# Patient Record
Sex: FEMALE | Race: WHITE | NOT HISPANIC OR LATINO | Employment: OTHER | URBAN - METROPOLITAN AREA
[De-identification: names, ages, dates, MRNs, and addresses within clinical notes are randomized per-mention and may not be internally consistent; named-entity substitution may affect disease eponyms.]

---

## 2017-08-30 ENCOUNTER — HISTORICAL (OUTPATIENT)
Dept: LAB | Facility: HOSPITAL | Age: 67
End: 2017-08-30

## 2017-08-30 LAB
ABS NEUT (OLG): 4.22 X10(3)/MCL (ref 2.1–9.2)
ALBUMIN SERPL-MCNC: 4 GM/DL (ref 3.4–5)
ALBUMIN/GLOB SERPL: 1.1 {RATIO}
ALP SERPL-CCNC: 80 UNIT/L (ref 38–126)
ALT SERPL-CCNC: 48 UNIT/L (ref 12–78)
AST SERPL-CCNC: 26 UNIT/L (ref 15–37)
BASOPHILS # BLD AUTO: 0 X10(3)/MCL (ref 0–0.2)
BASOPHILS NFR BLD AUTO: 1 %
BILIRUB SERPL-MCNC: 0.5 MG/DL (ref 0.2–1)
BILIRUBIN DIRECT+TOT PNL SERPL-MCNC: 0.2 MG/DL (ref 0–0.2)
BILIRUBIN DIRECT+TOT PNL SERPL-MCNC: 0.3 MG/DL (ref 0–0.8)
BUN SERPL-MCNC: 16 MG/DL (ref 7–18)
CALCIUM SERPL-MCNC: 9 MG/DL (ref 8.5–10.1)
CHLORIDE SERPL-SCNC: 102 MMOL/L (ref 98–107)
CHOLEST SERPL-MCNC: 239 MG/DL (ref 0–200)
CHOLEST/HDLC SERPL: 4.5 {RATIO} (ref 0–4)
CO2 SERPL-SCNC: 29 MMOL/L (ref 21–32)
CREAT SERPL-MCNC: 0.68 MG/DL (ref 0.55–1.02)
CREAT UR-MCNC: 83.2 MG/DL
DEPRECATED CALCIDIOL+CALCIFEROL SERPL-MC: 28.45 NG/ML (ref 30–80)
EOSINOPHIL # BLD AUTO: 0.3 X10(3)/MCL (ref 0–0.9)
EOSINOPHIL NFR BLD AUTO: 4 %
ERYTHROCYTE [DISTWIDTH] IN BLOOD BY AUTOMATED COUNT: 14.1 % (ref 11.5–17)
EST. AVERAGE GLUCOSE BLD GHB EST-MCNC: 143 MG/DL
GLOBULIN SER-MCNC: 3.7 GM/DL (ref 2.4–3.5)
GLUCOSE SERPL-MCNC: 140 MG/DL (ref 74–106)
HBA1C MFR BLD: 6.6 % (ref 4.2–6.3)
HCT VFR BLD AUTO: 46.6 % (ref 37–47)
HDLC SERPL-MCNC: 53 MG/DL (ref 35–60)
HGB BLD-MCNC: 14.5 GM/DL (ref 12–16)
LDLC SERPL CALC-MCNC: 151 MG/DL (ref 0–129)
LYMPHOCYTES # BLD AUTO: 2.4 X10(3)/MCL (ref 0.6–4.6)
LYMPHOCYTES NFR BLD AUTO: 32 %
MCH RBC QN AUTO: 28 PG (ref 27–31)
MCHC RBC AUTO-ENTMCNC: 31.1 GM/DL (ref 33–36)
MCV RBC AUTO: 90 FL (ref 80–94)
MICROALBUMIN UR-MCNC: 0.5 MG/DL
MICROALBUMIN/CREAT RATIO PNL UR: 6 MG/GM CR (ref 0–30)
MONOCYTES # BLD AUTO: 0.5 X10(3)/MCL (ref 0.1–1.3)
MONOCYTES NFR BLD AUTO: 7 %
NEUTROPHILS # BLD AUTO: 4.22 X10(3)/MCL (ref 2.1–9.2)
NEUTROPHILS NFR BLD AUTO: 56 %
PLATELET # BLD AUTO: 254 X10(3)/MCL (ref 130–400)
PMV BLD AUTO: 10.6 FL (ref 9.4–12.4)
POTASSIUM SERPL-SCNC: 4 MMOL/L (ref 3.5–5.1)
PROT SERPL-MCNC: 7.7 GM/DL (ref 6.4–8.2)
RBC # BLD AUTO: 5.18 X10(6)/MCL (ref 4.2–5.4)
SODIUM SERPL-SCNC: 140 MMOL/L (ref 136–145)
T4 FREE SERPL-MCNC: 1.03 NG/DL (ref 0.76–1.46)
TRIGL SERPL-MCNC: 176 MG/DL (ref 30–150)
TSH SERPL-ACNC: 1.61 MIU/ML (ref 0.36–3.74)
VLDLC SERPL CALC-MCNC: 35 MG/DL
WBC # SPEC AUTO: 7.5 X10(3)/MCL (ref 4.5–11.5)

## 2018-01-23 ENCOUNTER — HISTORICAL (OUTPATIENT)
Dept: LAB | Facility: HOSPITAL | Age: 68
End: 2018-01-23

## 2018-01-23 LAB
CHOLEST SERPL-MCNC: 216 MG/DL (ref 0–200)
CHOLEST/HDLC SERPL: 3.8 {RATIO} (ref 0–4)
DEPRECATED CALCIDIOL+CALCIFEROL SERPL-MC: 24.26 NG/ML (ref 30–80)
EST. AVERAGE GLUCOSE BLD GHB EST-MCNC: 157 MG/DL
HBA1C MFR BLD: 7.1 % (ref 4.2–6.3)
HDLC SERPL-MCNC: 57 MG/DL (ref 35–60)
LDLC SERPL CALC-MCNC: 135 MG/DL (ref 0–129)
TRIGL SERPL-MCNC: 122 MG/DL (ref 30–150)
VLDLC SERPL CALC-MCNC: 24 MG/DL

## 2018-05-23 ENCOUNTER — HISTORICAL (OUTPATIENT)
Dept: LAB | Facility: HOSPITAL | Age: 68
End: 2018-05-23

## 2018-05-23 LAB
CHOLEST SERPL-MCNC: 161 MG/DL (ref 0–200)
CHOLEST/HDLC SERPL: 2.9 {RATIO} (ref 0–4)
DEPRECATED CALCIDIOL+CALCIFEROL SERPL-MC: 30 NG/ML (ref 30–80)
EST. AVERAGE GLUCOSE BLD GHB EST-MCNC: 166 MG/DL
HBA1C MFR BLD: 7.4 % (ref 4.2–6.3)
HDLC SERPL-MCNC: 55 MG/DL (ref 35–60)
LDLC SERPL CALC-MCNC: 87 MG/DL (ref 0–129)
TRIGL SERPL-MCNC: 96 MG/DL (ref 30–150)
VLDLC SERPL CALC-MCNC: 19 MG/DL

## 2018-08-16 ENCOUNTER — HISTORICAL (OUTPATIENT)
Dept: RADIOLOGY | Facility: HOSPITAL | Age: 68
End: 2018-08-16

## 2018-08-28 ENCOUNTER — HISTORICAL (OUTPATIENT)
Dept: LAB | Facility: HOSPITAL | Age: 68
End: 2018-08-28

## 2018-08-28 LAB
EST. AVERAGE GLUCOSE BLD GHB EST-MCNC: 151 MG/DL
HBA1C MFR BLD: 6.9 % (ref 4.2–6.3)

## 2019-01-14 ENCOUNTER — HISTORICAL (OUTPATIENT)
Dept: LAB | Facility: HOSPITAL | Age: 69
End: 2019-01-14

## 2019-01-14 LAB
ABS NEUT (OLG): 3.37 X10(3)/MCL (ref 2.1–9.2)
ALBUMIN SERPL-MCNC: 4.1 GM/DL (ref 3.4–5)
ALBUMIN/GLOB SERPL: 1.3 {RATIO}
ALP SERPL-CCNC: 77 UNIT/L (ref 38–126)
ALT SERPL-CCNC: 64 UNIT/L (ref 12–78)
APPEARANCE, UA: CLEAR
AST SERPL-CCNC: 43 UNIT/L (ref 15–37)
BACTERIA SPEC CULT: ABNORMAL /HPF
BASOPHILS # BLD AUTO: 0 X10(3)/MCL (ref 0–0.2)
BASOPHILS NFR BLD AUTO: 0 %
BILIRUB SERPL-MCNC: 0.6 MG/DL (ref 0.2–1)
BILIRUB UR QL STRIP: NEGATIVE
BILIRUBIN DIRECT+TOT PNL SERPL-MCNC: 0.2 MG/DL (ref 0–0.2)
BILIRUBIN DIRECT+TOT PNL SERPL-MCNC: 0.4 MG/DL (ref 0–0.8)
BUN SERPL-MCNC: 12 MG/DL (ref 7–18)
CALCIUM SERPL-MCNC: 9.6 MG/DL (ref 8.5–10.1)
CHLORIDE SERPL-SCNC: 104 MMOL/L (ref 98–107)
CHOLEST SERPL-MCNC: 210 MG/DL (ref 0–200)
CHOLEST/HDLC SERPL: 4.3 {RATIO} (ref 0–4)
CO2 SERPL-SCNC: 25 MMOL/L (ref 21–32)
COLOR UR: YELLOW
CREAT SERPL-MCNC: 0.78 MG/DL (ref 0.55–1.02)
CREAT UR-MCNC: 88 MG/DL
EOSINOPHIL # BLD AUTO: 0.1 X10(3)/MCL (ref 0–0.9)
EOSINOPHIL NFR BLD AUTO: 2 %
ERYTHROCYTE [DISTWIDTH] IN BLOOD BY AUTOMATED COUNT: 13.4 % (ref 11.5–17)
EST. AVERAGE GLUCOSE BLD GHB EST-MCNC: 189 MG/DL
GLOBULIN SER-MCNC: 3.2 GM/DL (ref 2.4–3.5)
GLUCOSE (UA): NEGATIVE
GLUCOSE SERPL-MCNC: 155 MG/DL (ref 74–106)
HBA1C MFR BLD: 8.2 % (ref 4.2–6.3)
HCT VFR BLD AUTO: 46.8 % (ref 37–47)
HDLC SERPL-MCNC: 49 MG/DL (ref 35–60)
HGB BLD-MCNC: 14.5 GM/DL (ref 12–16)
HGB UR QL STRIP: NEGATIVE
KETONES UR QL STRIP: NEGATIVE
LDLC SERPL CALC-MCNC: 130 MG/DL (ref 0–129)
LEUKOCYTE ESTERASE UR QL STRIP: ABNORMAL
LYMPHOCYTES # BLD AUTO: 2.1 X10(3)/MCL (ref 0.6–4.6)
LYMPHOCYTES NFR BLD AUTO: 34 %
MCH RBC QN AUTO: 28.4 PG (ref 27–31)
MCHC RBC AUTO-ENTMCNC: 31 GM/DL (ref 33–36)
MCV RBC AUTO: 91.6 FL (ref 80–94)
MICROALBUMIN UR-MCNC: 1.5 MG/DL
MICROALBUMIN/CREAT RATIO PNL UR: 17 MG/GM CR (ref 0–30)
MONOCYTES # BLD AUTO: 0.5 X10(3)/MCL (ref 0.1–1.3)
MONOCYTES NFR BLD AUTO: 8 %
NEUTROPHILS # BLD AUTO: 3.37 X10(3)/MCL (ref 2.1–9.2)
NEUTROPHILS NFR BLD AUTO: 55 %
NITRITE UR QL STRIP: NEGATIVE
PH UR STRIP: 5 [PH] (ref 5–9)
PLATELET # BLD AUTO: 250 X10(3)/MCL (ref 130–400)
PMV BLD AUTO: 10.6 FL (ref 9.4–12.4)
POTASSIUM SERPL-SCNC: 4.2 MMOL/L (ref 3.5–5.1)
PROT SERPL-MCNC: 7.3 GM/DL (ref 6.4–8.2)
PROT UR QL STRIP: NEGATIVE
RBC # BLD AUTO: 5.11 X10(6)/MCL (ref 4.2–5.4)
RBC #/AREA URNS HPF: ABNORMAL /[HPF]
SODIUM SERPL-SCNC: 138 MMOL/L (ref 136–145)
SP GR UR STRIP: 1.01 (ref 1–1.03)
SQUAMOUS EPITHELIAL, UA: ABNORMAL
TRIGL SERPL-MCNC: 157 MG/DL (ref 30–150)
TSH SERPL-ACNC: 1.77 MIU/L (ref 0.36–3.74)
UROBILINOGEN UR STRIP-ACNC: 0.2
VLDLC SERPL CALC-MCNC: 31 MG/DL
WBC # SPEC AUTO: 6.1 X10(3)/MCL (ref 4.5–11.5)
WBC #/AREA URNS HPF: 5 /HPF (ref 0–3)

## 2019-03-14 ENCOUNTER — HISTORICAL (OUTPATIENT)
Dept: RADIOLOGY | Facility: HOSPITAL | Age: 69
End: 2019-03-14

## 2019-03-19 ENCOUNTER — HISTORICAL (OUTPATIENT)
Dept: LAB | Facility: HOSPITAL | Age: 69
End: 2019-03-19

## 2019-03-19 ENCOUNTER — HISTORICAL (OUTPATIENT)
Dept: PREADMISSION TESTING | Facility: HOSPITAL | Age: 69
End: 2019-03-19

## 2019-03-19 LAB
ABS NEUT (OLG): 4.74 X10(3)/MCL (ref 2.1–9.2)
ALBUMIN SERPL-MCNC: 3.7 GM/DL (ref 3.4–5)
ALBUMIN/GLOB SERPL: 1 {RATIO}
ALP SERPL-CCNC: 89 UNIT/L (ref 38–126)
ALT SERPL-CCNC: 42 UNIT/L (ref 12–78)
AST SERPL-CCNC: 31 UNIT/L (ref 15–37)
BASOPHILS # BLD AUTO: 0 X10(3)/MCL (ref 0–0.2)
BASOPHILS NFR BLD AUTO: 0 %
BILIRUB SERPL-MCNC: 0.4 MG/DL (ref 0.2–1)
BILIRUBIN DIRECT+TOT PNL SERPL-MCNC: 0.2 MG/DL (ref 0–0.2)
BILIRUBIN DIRECT+TOT PNL SERPL-MCNC: 0.2 MG/DL (ref 0–0.8)
BUN SERPL-MCNC: 15 MG/DL (ref 7–18)
CALCIUM SERPL-MCNC: 9 MG/DL (ref 8.5–10.1)
CHLORIDE SERPL-SCNC: 101 MMOL/L (ref 98–107)
CO2 SERPL-SCNC: 25 MMOL/L (ref 21–32)
CREAT SERPL-MCNC: 0.61 MG/DL (ref 0.55–1.02)
EOSINOPHIL # BLD AUTO: 0.1 X10(3)/MCL (ref 0–0.9)
EOSINOPHIL NFR BLD AUTO: 2 %
ERYTHROCYTE [DISTWIDTH] IN BLOOD BY AUTOMATED COUNT: 13.7 % (ref 11.5–17)
GLOBULIN SER-MCNC: 3.6 GM/DL (ref 2.4–3.5)
GLUCOSE SERPL-MCNC: 161 MG/DL (ref 74–106)
HCT VFR BLD AUTO: 43.8 % (ref 37–47)
HGB BLD-MCNC: 14.1 GM/DL (ref 12–16)
LYMPHOCYTES # BLD AUTO: 2.4 X10(3)/MCL (ref 0.6–4.6)
LYMPHOCYTES NFR BLD AUTO: 30 %
MCH RBC QN AUTO: 28.6 PG (ref 27–31)
MCHC RBC AUTO-ENTMCNC: 32.2 GM/DL (ref 33–36)
MCV RBC AUTO: 88.8 FL (ref 80–94)
MONOCYTES # BLD AUTO: 0.6 X10(3)/MCL (ref 0.1–1.3)
MONOCYTES NFR BLD AUTO: 8 %
NEUTROPHILS # BLD AUTO: 4.74 X10(3)/MCL (ref 2.1–9.2)
NEUTROPHILS NFR BLD AUTO: 60 %
PLATELET # BLD AUTO: 257 X10(3)/MCL (ref 130–400)
PMV BLD AUTO: 11.1 FL (ref 9.4–12.4)
POTASSIUM SERPL-SCNC: 4.4 MMOL/L (ref 3.5–5.1)
PROT SERPL-MCNC: 7.3 GM/DL (ref 6.4–8.2)
RBC # BLD AUTO: 4.93 X10(6)/MCL (ref 4.2–5.4)
SODIUM SERPL-SCNC: 137 MMOL/L (ref 136–145)
WBC # SPEC AUTO: 8 X10(3)/MCL (ref 4.5–11.5)

## 2019-03-28 ENCOUNTER — HISTORICAL (OUTPATIENT)
Dept: RADIOLOGY | Facility: HOSPITAL | Age: 69
End: 2019-03-28

## 2019-03-29 ENCOUNTER — HISTORICAL (OUTPATIENT)
Dept: SURGERY | Facility: HOSPITAL | Age: 69
End: 2019-03-29

## 2019-05-06 ENCOUNTER — HISTORICAL (OUTPATIENT)
Dept: SURGERY | Facility: HOSPITAL | Age: 69
End: 2019-05-06

## 2019-07-11 ENCOUNTER — HISTORICAL (OUTPATIENT)
Dept: LAB | Facility: HOSPITAL | Age: 69
End: 2019-07-11

## 2019-07-11 LAB
ALBUMIN SERPL-MCNC: 3.7 GM/DL (ref 3.4–5)
ALBUMIN/GLOB SERPL: 0.9 RATIO (ref 1–2)
ALP SERPL-CCNC: 88 UNIT/L (ref 45–117)
ALT SERPL-CCNC: 60 UNIT/L (ref 13–56)
AST SERPL-CCNC: 49 UNIT/L (ref 15–37)
BILIRUB SERPL-MCNC: 0.4 MG/DL (ref 0.2–1)
BILIRUBIN DIRECT+TOT PNL SERPL-MCNC: 0.13 MG/DL (ref 0–0.2)
BILIRUBIN DIRECT+TOT PNL SERPL-MCNC: 0.27 MG/DL (ref 0–1)
BUN SERPL-MCNC: 12 MG/DL (ref 7–18)
CALCIUM SERPL-MCNC: 8.9 MG/DL (ref 8.5–10.1)
CHLORIDE SERPL-SCNC: 107 MMOL/L (ref 98–107)
CHOLEST SERPL-MCNC: 148 MG/DL (ref 0–199)
CHOLEST/HDLC SERPL: 3 MG/DL (ref 0–8)
CO2 SERPL-SCNC: 22 MMOL/L (ref 21–32)
CREAT SERPL-MCNC: 0.78 MG/DL (ref 0.55–1.02)
EST. AVERAGE GLUCOSE BLD GHB EST-MCNC: 183 MG/DL
GLOBULIN SER-MCNC: 4 GM/DL (ref 2–4)
GLUCOSE SERPL-MCNC: 189 MG/DL (ref 74–106)
HBA1C MFR BLD: 8 % (ref 4.2–6.3)
HDLC SERPL-MCNC: 51 MG/DL
LDLC SERPL CALC-MCNC: 74 MG/DL (ref 0–129)
POTASSIUM SERPL-SCNC: 4.2 MMOL/L (ref 3.5–5.1)
PROT SERPL-MCNC: 7.5 GM/DL (ref 6.4–8.2)
SODIUM SERPL-SCNC: 138 MMOL/L (ref 136–145)
TRIGL SERPL-MCNC: 114 MG/DL (ref 0–149)
VLDLC SERPL CALC-MCNC: 23 MG/DL

## 2019-08-19 ENCOUNTER — HISTORICAL (OUTPATIENT)
Dept: RADIOLOGY | Facility: HOSPITAL | Age: 69
End: 2019-08-19

## 2019-08-19 ENCOUNTER — HISTORICAL (OUTPATIENT)
Dept: ADMINISTRATIVE | Facility: HOSPITAL | Age: 69
End: 2019-08-19

## 2020-01-14 ENCOUNTER — HISTORICAL (OUTPATIENT)
Dept: LAB | Facility: HOSPITAL | Age: 70
End: 2020-01-14

## 2020-01-14 LAB
ABS NEUT (OLG): 4.05 X10(3)/MCL (ref 2.1–9.2)
ALBUMIN SERPL-MCNC: 3.9 GM/DL (ref 3.4–5)
ALBUMIN/GLOB SERPL: 1 RATIO (ref 1–2)
ALP SERPL-CCNC: 66 UNIT/L (ref 45–117)
ALT SERPL-CCNC: 44 UNIT/L (ref 13–56)
APPEARANCE, UA: ABNORMAL
AST SERPL-CCNC: 37 UNIT/L (ref 15–37)
BACTERIA SPEC CULT: ABNORMAL /HPF
BASOPHILS # BLD AUTO: 0.03 X10(3)/MCL (ref 0–0.2)
BASOPHILS NFR BLD AUTO: 0.4 % (ref 0–1)
BILIRUB SERPL-MCNC: 0.4 MG/DL (ref 0.2–1)
BILIRUB UR QL STRIP: NEGATIVE
BILIRUBIN DIRECT+TOT PNL SERPL-MCNC: 0.17 MG/DL (ref 0–0.2)
BILIRUBIN DIRECT+TOT PNL SERPL-MCNC: 0.23 MG/DL (ref 0–1)
BUN SERPL-MCNC: 16 MG/DL (ref 7–18)
CALCIUM SERPL-MCNC: 9.4 MG/DL (ref 8.5–10.1)
CHLORIDE SERPL-SCNC: 106 MMOL/L (ref 98–107)
CHOLEST SERPL-MCNC: 151 MG/DL (ref 0–199)
CHOLEST/HDLC SERPL: 3 {RATIO}
CO2 SERPL-SCNC: 26 MMOL/L (ref 21–32)
COLOR UR: YELLOW
CREAT SERPL-MCNC: 0.83 MG/DL (ref 0.55–1.02)
CREAT UR-MCNC: 77 MG/DL
DEPRECATED CALCIDIOL+CALCIFEROL SERPL-MC: 29.13 NG/ML (ref 30–80)
EOSINOPHIL # BLD AUTO: 0.13 X10(3)/MCL (ref 0–0.9)
EOSINOPHIL NFR BLD AUTO: 1.8 % (ref 0–6.4)
ERYTHROCYTE [DISTWIDTH] IN BLOOD BY AUTOMATED COUNT: 14.6 % (ref 11.5–17)
EST. AVERAGE GLUCOSE BLD GHB EST-MCNC: 180 MG/DL
GLOBULIN SER-MCNC: 4 GM/DL (ref 2–4)
GLUCOSE (UA): NEGATIVE
GLUCOSE SERPL-MCNC: 148 MG/DL (ref 74–106)
HBA1C MFR BLD: 7.9 % (ref 4.2–6.3)
HCT VFR BLD AUTO: 39.4 % (ref 37–47)
HDLC SERPL-MCNC: 55 MG/DL
HGB BLD-MCNC: 13 GM/DL (ref 12–16)
HGB UR QL STRIP: NEGATIVE
IMM GRANULOCYTES # BLD AUTO: 0.02 10*3/UL (ref 0–0.02)
IMM GRANULOCYTES NFR BLD AUTO: 0.3 % (ref 0–0.43)
KETONES UR QL STRIP: NEGATIVE
LDLC SERPL CALC-MCNC: 74 MG/DL (ref 0–129)
LEUKOCYTE ESTERASE UR QL STRIP: ABNORMAL
LYMPHOCYTES # BLD AUTO: 2.28 X10(3)/MCL (ref 0.6–4.6)
LYMPHOCYTES NFR BLD AUTO: 32.4 % (ref 16–44)
MCH RBC QN AUTO: 28.5 PG (ref 27–31)
MCHC RBC AUTO-ENTMCNC: 33 GM/DL (ref 33–36)
MCV RBC AUTO: 86.4 FL (ref 80–94)
MICROALBUMIN UR-MCNC: 2.1 MG/DL
MICROALBUMIN/CREAT RATIO PNL UR: 27.3 MG/GM CR (ref 0–30)
MONOCYTES # BLD AUTO: 0.52 X10(3)/MCL (ref 0.1–1.3)
MONOCYTES NFR BLD AUTO: 7.4 % (ref 4–12.1)
NEUTROPHILS # BLD AUTO: 4.05 X10(3)/MCL (ref 2.1–9.2)
NEUTROPHILS NFR BLD AUTO: 57.7 % (ref 43–73)
NITRITE UR QL STRIP: POSITIVE
NRBC BLD AUTO-RTO: 0 % (ref 0–0.2)
PH UR STRIP: 5.5 [PH] (ref 5–7)
PLATELET # BLD AUTO: 242 X10(3)/MCL (ref 130–400)
PMV BLD AUTO: 10.1 FL (ref 7.4–10.4)
POTASSIUM SERPL-SCNC: 4.5 MMOL/L (ref 3.5–5.1)
PROT SERPL-MCNC: 7.9 GM/DL (ref 6.4–8.2)
PROT UR QL STRIP: NEGATIVE
RBC # BLD AUTO: 4.56 X10(6)/MCL (ref 4.2–5.4)
RBC #/AREA URNS HPF: 0 /[HPF]
SODIUM SERPL-SCNC: 138 MMOL/L (ref 136–145)
SP GR UR STRIP: 1.02 (ref 1–1.03)
SQUAMOUS EPITHELIAL, UA: ABNORMAL /LPF
TRIGL SERPL-MCNC: 112 MG/DL (ref 0–149)
TSH SERPL-ACNC: 2.56 MIU/ML (ref 0.36–3.74)
UROBILINOGEN UR STRIP-ACNC: NEGATIVE
VLDLC SERPL CALC-MCNC: 22 MG/DL
WBC # SPEC AUTO: 7 X10(3)/MCL (ref 4.5–11.5)
WBC #/AREA URNS HPF: ABNORMAL /HPF

## 2020-02-06 ENCOUNTER — HISTORICAL (OUTPATIENT)
Dept: RADIOLOGY | Facility: HOSPITAL | Age: 70
End: 2020-02-06

## 2020-03-12 LAB — NONINV COLON CA DNA+OCC BLD SCRN STL QL: NEGATIVE

## 2020-07-15 ENCOUNTER — HISTORICAL (OUTPATIENT)
Dept: LAB | Facility: HOSPITAL | Age: 70
End: 2020-07-15

## 2020-07-15 LAB
ABS NEUT (OLG): 4.03 X10(3)/MCL (ref 2.1–9.2)
ALBUMIN SERPL-MCNC: 4 GM/DL (ref 3.4–4.8)
ALBUMIN/GLOB SERPL: 1.1 RATIO (ref 1.1–2)
ALP SERPL-CCNC: 70 UNIT/L (ref 40–150)
ALT SERPL-CCNC: 20 UNIT/L (ref 0–55)
APPEARANCE, UA: ABNORMAL
AST SERPL-CCNC: 18 UNIT/L (ref 5–34)
BACTERIA SPEC CULT: ABNORMAL /HPF
BASOPHILS # BLD AUTO: 0.04 X10(3)/MCL (ref 0–0.2)
BASOPHILS NFR BLD AUTO: 0.5 % (ref 0–1)
BILIRUB SERPL-MCNC: 0.5 MG/DL (ref 0.2–1.2)
BILIRUB UR QL STRIP: NEGATIVE
BILIRUBIN DIRECT+TOT PNL SERPL-MCNC: 0.2 MG/DL (ref 0–0.5)
BILIRUBIN DIRECT+TOT PNL SERPL-MCNC: 0.3 MG/DL (ref 0–0.8)
BUN SERPL-MCNC: 15.6 MG/DL (ref 9.8–20.1)
CALCIUM SERPL-MCNC: 10 MG/DL (ref 8.4–10.2)
CHLORIDE SERPL-SCNC: 104 MMOL/L (ref 98–107)
CHOLEST SERPL-MCNC: 161 MG/DL
CHOLEST/HDLC SERPL: 3 {RATIO} (ref 0–5)
CO2 SERPL-SCNC: 26 MMOL/L (ref 23–31)
COLOR UR: ABNORMAL
CREAT SERPL-MCNC: 0.7 MG/DL (ref 0.57–1.11)
DEPRECATED CALCIDIOL+CALCIFEROL SERPL-MC: 40.2 NG/ML (ref 6.6–49.9)
EOSINOPHIL # BLD AUTO: 0.1 X10(3)/MCL (ref 0–0.9)
EOSINOPHIL NFR BLD AUTO: 1.3 % (ref 0–6.4)
ERYTHROCYTE [DISTWIDTH] IN BLOOD BY AUTOMATED COUNT: 14.3 % (ref 11.5–17)
EST. AVERAGE GLUCOSE BLD GHB EST-MCNC: 148.5 MG/DL
GLOBULIN SER-MCNC: 3.7 GM/DL (ref 2.4–3.5)
GLUCOSE (UA): NEGATIVE
GLUCOSE SERPL-MCNC: 144 MG/DL (ref 82–115)
HBA1C MFR BLD: 6.8 %
HCT VFR BLD AUTO: 42 % (ref 37–47)
HDLC SERPL-MCNC: 52 MG/DL (ref 40–60)
HGB BLD-MCNC: 13.6 GM/DL (ref 12–16)
HGB UR QL STRIP: NEGATIVE
IMM GRANULOCYTES # BLD AUTO: 0.02 10*3/UL (ref 0–0.02)
IMM GRANULOCYTES NFR BLD AUTO: 0.3 % (ref 0–0.43)
KETONES UR QL STRIP: NEGATIVE
LDLC SERPL CALC-MCNC: 86 MG/DL (ref 50–140)
LEUKOCYTE ESTERASE UR QL STRIP: ABNORMAL
LYMPHOCYTES # BLD AUTO: 2.92 X10(3)/MCL (ref 0.6–4.6)
LYMPHOCYTES NFR BLD AUTO: 38.3 % (ref 16–44)
MCH RBC QN AUTO: 28.3 PG (ref 27–31)
MCHC RBC AUTO-ENTMCNC: 32.4 GM/DL (ref 33–36)
MCV RBC AUTO: 87.5 FL (ref 80–94)
MONOCYTES # BLD AUTO: 0.51 X10(3)/MCL (ref 0.1–1.3)
MONOCYTES NFR BLD AUTO: 6.7 % (ref 4–12.1)
NEUTROPHILS # BLD AUTO: 4.03 X10(3)/MCL (ref 2.1–9.2)
NEUTROPHILS NFR BLD AUTO: 52.9 % (ref 43–73)
NITRITE UR QL STRIP: NEGATIVE
NRBC BLD AUTO-RTO: 0 % (ref 0–0.2)
PH UR STRIP: 5.5 [PH] (ref 5–7)
PLATELET # BLD AUTO: 233 X10(3)/MCL (ref 130–400)
PMV BLD AUTO: 10.5 FL (ref 7.4–10.4)
POTASSIUM SERPL-SCNC: 4.1 MMOL/L (ref 3.5–5.1)
PROT SERPL-MCNC: 7.7 GM/DL (ref 5.8–7.6)
PROT UR QL STRIP: NEGATIVE
RBC # BLD AUTO: 4.8 X10(6)/MCL (ref 4.2–5.4)
RBC #/AREA URNS HPF: 0 /[HPF]
SODIUM SERPL-SCNC: 140 MMOL/L (ref 136–145)
SP GR UR STRIP: 1.02 (ref 1–1.03)
SQUAMOUS EPITHELIAL, UA: ABNORMAL /LPF
TRIGL SERPL-MCNC: 113 MG/DL (ref 0–150)
UROBILINOGEN UR STRIP-ACNC: NEGATIVE
VLDLC SERPL CALC-MCNC: 23 MG/DL
WBC # SPEC AUTO: 7.6 X10(3)/MCL (ref 4.5–11.5)
WBC #/AREA URNS HPF: ABNORMAL /HPF

## 2020-07-18 LAB — FINAL CULTURE: NORMAL

## 2020-08-24 ENCOUNTER — HISTORICAL (OUTPATIENT)
Dept: RADIOLOGY | Facility: HOSPITAL | Age: 70
End: 2020-08-24

## 2021-01-18 ENCOUNTER — HISTORICAL (OUTPATIENT)
Dept: LAB | Facility: HOSPITAL | Age: 71
End: 2021-01-18

## 2021-01-18 LAB
ABS NEUT (OLG): 3.96 X10(3)/MCL (ref 2.1–9.2)
ALBUMIN SERPL-MCNC: 4.3 GM/DL (ref 3.4–4.8)
ALBUMIN/GLOB SERPL: 1.2 RATIO (ref 1.1–2)
ALP SERPL-CCNC: 64 UNIT/L (ref 40–150)
ALT SERPL-CCNC: 19 UNIT/L (ref 0–55)
APPEARANCE, UA: CLEAR
AST SERPL-CCNC: 19 UNIT/L (ref 5–34)
BACTERIA SPEC CULT: NORMAL
BASOPHILS # BLD AUTO: 0.03 X10(3)/MCL (ref 0–0.2)
BASOPHILS NFR BLD AUTO: 0.4 % (ref 0–1)
BILIRUB SERPL-MCNC: 0.5 MG/DL (ref 0.2–1.2)
BILIRUB UR QL STRIP: NEGATIVE
BILIRUBIN DIRECT+TOT PNL SERPL-MCNC: 0.2 MG/DL (ref 0–0.5)
BILIRUBIN DIRECT+TOT PNL SERPL-MCNC: 0.3 MG/DL (ref 0–0.8)
BUN SERPL-MCNC: 11.1 MG/DL (ref 9.8–20.1)
CALCIUM SERPL-MCNC: 9.9 MG/DL (ref 8.4–10.2)
CHLORIDE SERPL-SCNC: 105 MMOL/L (ref 98–107)
CHOLEST SERPL-MCNC: 150 MG/DL
CHOLEST/HDLC SERPL: 3 {RATIO} (ref 0–5)
CO2 SERPL-SCNC: 27 MMOL/L (ref 23–31)
COLOR UR: YELLOW
CREAT SERPL-MCNC: 0.74 MG/DL (ref 0.57–1.11)
CREAT UR-MCNC: 38.3 MG/DL (ref 45–106)
EOSINOPHIL # BLD AUTO: 0.12 X10(3)/MCL (ref 0–0.9)
EOSINOPHIL NFR BLD AUTO: 1.7 % (ref 0–6.4)
ERYTHROCYTE [DISTWIDTH] IN BLOOD BY AUTOMATED COUNT: 14.1 % (ref 11.5–17)
EST. AVERAGE GLUCOSE BLD GHB EST-MCNC: 148.5 MG/DL
GLOBULIN SER-MCNC: 3.5 GM/DL (ref 2.4–3.5)
GLUCOSE (UA): NEGATIVE
GLUCOSE SERPL-MCNC: 147 MG/DL (ref 82–115)
HBA1C MFR BLD: 6.8 %
HCT VFR BLD AUTO: 43.7 % (ref 37–47)
HDLC SERPL-MCNC: 48 MG/DL (ref 40–60)
HGB BLD-MCNC: 13.9 GM/DL (ref 12–16)
HGB UR QL STRIP: NEGATIVE
IMM GRANULOCYTES # BLD AUTO: 0.02 10*3/UL (ref 0–0.02)
IMM GRANULOCYTES NFR BLD AUTO: 0.3 % (ref 0–0.43)
KETONES UR QL STRIP: NEGATIVE
LDLC SERPL CALC-MCNC: 75 MG/DL (ref 50–140)
LEUKOCYTE ESTERASE UR QL STRIP: ABNORMAL
LYMPHOCYTES # BLD AUTO: 2.6 X10(3)/MCL (ref 0.6–4.6)
LYMPHOCYTES NFR BLD AUTO: 36 % (ref 16–44)
MCH RBC QN AUTO: 28 PG (ref 27–31)
MCHC RBC AUTO-ENTMCNC: 31.8 GM/DL (ref 33–36)
MCV RBC AUTO: 88.1 FL (ref 80–94)
MICROALBUMIN UR-MCNC: <5 UG/ML
MICROALBUMIN/CREAT RATIO PNL UR: <13.1 MG/GM CR (ref 0–30)
MONOCYTES # BLD AUTO: 0.49 X10(3)/MCL (ref 0.1–1.3)
MONOCYTES NFR BLD AUTO: 6.8 % (ref 4–12.1)
NEUTROPHILS # BLD AUTO: 3.96 X10(3)/MCL (ref 2.1–9.2)
NEUTROPHILS NFR BLD AUTO: 54.8 % (ref 43–73)
NITRITE UR QL STRIP: NEGATIVE
NRBC BLD AUTO-RTO: 0 % (ref 0–0.2)
PH UR STRIP: 5.5 [PH] (ref 5–7)
PLATELET # BLD AUTO: 222 X10(3)/MCL (ref 130–400)
PMV BLD AUTO: 10.8 FL (ref 7.4–10.4)
POTASSIUM SERPL-SCNC: 4.9 MMOL/L (ref 3.5–5.1)
PROT SERPL-MCNC: 7.8 GM/DL (ref 5.8–7.6)
PROT UR QL STRIP: NEGATIVE
RBC # BLD AUTO: 4.96 X10(6)/MCL (ref 4.2–5.4)
RBC #/AREA URNS HPF: 0 /[HPF]
SODIUM SERPL-SCNC: 140 MMOL/L (ref 136–145)
SP GR UR STRIP: 1.02 (ref 1–1.03)
SQUAMOUS EPITHELIAL, UA: NORMAL /LPF
TRIGL SERPL-MCNC: 133 MG/DL (ref 0–150)
TSH SERPL-ACNC: 2.48 UIU/ML (ref 0.35–4.94)
UROBILINOGEN UR STRIP-ACNC: NEGATIVE
VLDLC SERPL CALC-MCNC: 27 MG/DL
WBC # SPEC AUTO: 7.2 X10(3)/MCL (ref 4.5–11.5)
WBC #/AREA URNS HPF: NORMAL /HPF

## 2021-01-20 LAB — FINAL CULTURE: NORMAL

## 2021-04-26 ENCOUNTER — HISTORICAL (OUTPATIENT)
Dept: RADIOLOGY | Facility: HOSPITAL | Age: 71
End: 2021-04-26

## 2021-07-19 ENCOUNTER — HISTORICAL (OUTPATIENT)
Dept: LAB | Facility: HOSPITAL | Age: 71
End: 2021-07-19

## 2021-07-19 LAB
ALBUMIN SERPL-MCNC: 4 GM/DL (ref 3.4–4.8)
ALBUMIN/GLOB SERPL: 1.1 RATIO (ref 1.1–2)
ALP SERPL-CCNC: 62 UNIT/L (ref 40–150)
ALT SERPL-CCNC: 12 UNIT/L (ref 0–55)
AST SERPL-CCNC: 14 UNIT/L (ref 5–34)
BILIRUB SERPL-MCNC: 0.6 MG/DL (ref 0.2–1.2)
BILIRUBIN DIRECT+TOT PNL SERPL-MCNC: 0.3 MG/DL (ref 0–0.5)
BILIRUBIN DIRECT+TOT PNL SERPL-MCNC: 0.3 MG/DL (ref 0–0.8)
BUN SERPL-MCNC: 12.1 MG/DL (ref 9.8–20.1)
CALCIUM SERPL-MCNC: 10.1 MG/DL (ref 8.4–10.2)
CHLORIDE SERPL-SCNC: 108 MMOL/L (ref 98–107)
CO2 SERPL-SCNC: 24 MMOL/L (ref 23–31)
CREAT SERPL-MCNC: 0.77 MG/DL (ref 0.57–1.11)
EST. AVERAGE GLUCOSE BLD GHB EST-MCNC: 128.4 MG/DL
GLOBULIN SER-MCNC: 3.6 GM/DL (ref 2.4–3.5)
GLUCOSE SERPL-MCNC: 131 MG/DL (ref 82–115)
HBA1C MFR BLD: 6.1 %
POTASSIUM SERPL-SCNC: 4.4 MMOL/L (ref 3.5–5.1)
PROT SERPL-MCNC: 7.6 GM/DL (ref 5.8–7.6)
SODIUM SERPL-SCNC: 142 MMOL/L (ref 136–145)

## 2021-09-07 ENCOUNTER — HISTORICAL (OUTPATIENT)
Dept: RADIOLOGY | Facility: HOSPITAL | Age: 71
End: 2021-09-07

## 2022-01-20 ENCOUNTER — HISTORICAL (OUTPATIENT)
Dept: LAB | Facility: HOSPITAL | Age: 72
End: 2022-01-20

## 2022-01-20 LAB
ABS NEUT (OLG): 3.54 X10(3)/MCL (ref 2.1–9.2)
ALBUMIN SERPL-MCNC: 4.1 GM/DL (ref 3.4–4.8)
ALBUMIN/GLOB SERPL: 1.2 RATIO (ref 1.1–2)
ALP SERPL-CCNC: 69 UNIT/L (ref 40–150)
ALT SERPL-CCNC: 16 UNIT/L (ref 0–55)
APPEARANCE, UA: ABNORMAL
AST SERPL-CCNC: 16 UNIT/L (ref 5–34)
BACTERIA SPEC CULT: NORMAL
BASOPHILS # BLD AUTO: 0.04 X10(3)/MCL (ref 0–0.2)
BASOPHILS NFR BLD AUTO: 0.6 % (ref 0–1)
BILIRUB SERPL-MCNC: 0.6 MG/DL (ref 0.2–1.2)
BILIRUB UR QL STRIP: NEGATIVE
BILIRUBIN DIRECT+TOT PNL SERPL-MCNC: 0.3 MG/DL (ref 0–0.5)
BILIRUBIN DIRECT+TOT PNL SERPL-MCNC: 0.3 MG/DL (ref 0–0.8)
BUN SERPL-MCNC: 14.2 MG/DL (ref 9.8–20.1)
CALCIUM SERPL-MCNC: 9.9 MG/DL (ref 8.4–10.2)
CHLORIDE SERPL-SCNC: 103 MMOL/L (ref 98–107)
CHOLEST SERPL-MCNC: 169 MG/DL
CHOLEST/HDLC SERPL: 3 {RATIO} (ref 0–5)
CO2 SERPL-SCNC: 27 MMOL/L (ref 23–31)
COLOR UR: YELLOW
CREAT SERPL-MCNC: 0.77 MG/DL (ref 0.57–1.11)
CREAT UR-MCNC: 67.1 MG/DL (ref 45–106)
DEPRECATED CALCIDIOL+CALCIFEROL SERPL-MC: 33.5 NG/ML (ref 30–80)
EOSINOPHIL # BLD AUTO: 0.11 X10(3)/MCL (ref 0–0.9)
EOSINOPHIL NFR BLD AUTO: 1.7 % (ref 0–6.4)
ERYTHROCYTE [DISTWIDTH] IN BLOOD BY AUTOMATED COUNT: 13.7 % (ref 11.5–17)
EST. AVERAGE GLUCOSE BLD GHB EST-MCNC: 177.2 MG/DL
GLOBULIN SER-MCNC: 3.5 GM/DL (ref 2.4–3.5)
GLUCOSE (UA): NEGATIVE
GLUCOSE SERPL-MCNC: 220 MG/DL (ref 82–115)
HBA1C MFR BLD: 7.8 %
HCT VFR BLD AUTO: 41.5 % (ref 37–47)
HDLC SERPL-MCNC: 50 MG/DL (ref 40–60)
HGB BLD-MCNC: 13.6 GM/DL (ref 12–16)
HGB UR QL STRIP: NEGATIVE
IMM GRANULOCYTES # BLD AUTO: 0.01 10*3/UL (ref 0–0.02)
IMM GRANULOCYTES NFR BLD AUTO: 0.2 % (ref 0–0.43)
KETONES UR QL STRIP: NEGATIVE
LDLC SERPL CALC-MCNC: 93 MG/DL (ref 50–140)
LEUKOCYTE ESTERASE UR QL STRIP: ABNORMAL
LYMPHOCYTES # BLD AUTO: 2.47 X10(3)/MCL (ref 0.6–4.6)
LYMPHOCYTES NFR BLD AUTO: 37.5 % (ref 16–44)
MCH RBC QN AUTO: 28.3 PG (ref 27–31)
MCHC RBC AUTO-ENTMCNC: 32.8 GM/DL (ref 33–36)
MCV RBC AUTO: 86.3 FL (ref 80–94)
MICROALBUMIN UR-MCNC: <5 UG/ML
MICROALBUMIN/CREAT RATIO PNL UR: <7.5 MG/GM CR (ref 0–30)
MONOCYTES # BLD AUTO: 0.42 X10(3)/MCL (ref 0.1–1.3)
MONOCYTES NFR BLD AUTO: 6.4 % (ref 4–12.1)
NEUTROPHILS # BLD AUTO: 3.54 X10(3)/MCL (ref 2.1–9.2)
NEUTROPHILS NFR BLD AUTO: 53.6 % (ref 43–73)
NITRITE UR QL STRIP: NEGATIVE
NRBC BLD AUTO-RTO: 0 % (ref 0–0.2)
PH UR STRIP: 5.5 [PH] (ref 5–7)
PLATELET # BLD AUTO: 259 X10(3)/MCL (ref 130–400)
PMV BLD AUTO: 10.3 FL (ref 7.4–10.4)
POTASSIUM SERPL-SCNC: 5.6 MMOL/L (ref 3.5–5.1)
PROT SERPL-MCNC: 7.6 GM/DL (ref 5.8–7.6)
PROT UR QL STRIP: NEGATIVE
RBC # BLD AUTO: 4.81 X10(6)/MCL (ref 4.2–5.4)
RBC #/AREA URNS HPF: 0 /[HPF]
SODIUM SERPL-SCNC: 139 MMOL/L (ref 136–145)
SP GR UR STRIP: 1.02 (ref 1–1.03)
SQUAMOUS EPITHELIAL, UA: NORMAL /LPF
TRIGL SERPL-MCNC: 128 MG/DL (ref 0–150)
TSH SERPL-ACNC: 1.67 UIU/ML (ref 0.35–4.94)
UROBILINOGEN UR STRIP-ACNC: NEGATIVE
VLDLC SERPL CALC-MCNC: 26 MG/DL
WBC # SPEC AUTO: 6.6 X10(3)/MCL (ref 4.5–11.5)
WBC #/AREA URNS HPF: NORMAL /HPF

## 2022-01-22 LAB — FINAL CULTURE: NORMAL

## 2022-01-27 LAB — BCS RECOMMENDATION EXT: NORMAL

## 2022-03-07 ENCOUNTER — HISTORICAL (OUTPATIENT)
Dept: RADIOLOGY | Facility: HOSPITAL | Age: 72
End: 2022-03-07

## 2022-03-07 ENCOUNTER — HISTORICAL (OUTPATIENT)
Dept: ADMINISTRATIVE | Facility: HOSPITAL | Age: 72
End: 2022-03-07

## 2022-03-14 ENCOUNTER — HISTORICAL (OUTPATIENT)
Dept: RADIOLOGY | Facility: HOSPITAL | Age: 72
End: 2022-03-14

## 2022-03-14 ENCOUNTER — HISTORICAL (OUTPATIENT)
Dept: ADMINISTRATIVE | Facility: HOSPITAL | Age: 72
End: 2022-03-14

## 2022-03-17 LAB
LEFT EYE DM RETINOPATHY: NEGATIVE
RIGHT EYE DM RETINOPATHY: NEGATIVE

## 2022-03-21 ENCOUNTER — HISTORICAL (OUTPATIENT)
Dept: LAB | Facility: HOSPITAL | Age: 72
End: 2022-03-21

## 2022-03-21 LAB
ALBUMIN SERPL-MCNC: 3.8 G/DL (ref 3.4–4.8)
ALBUMIN/GLOB SERPL: 1.1 {RATIO} (ref 1.1–2)
ALP SERPL-CCNC: 67 U/L (ref 40–150)
ALT SERPL-CCNC: 20 U/L (ref 0–55)
AST SERPL-CCNC: 15 U/L (ref 5–34)
BILIRUB SERPL-MCNC: 0.6 MG/DL (ref 0.2–1.2)
BILIRUBIN DIRECT+TOT PNL SERPL-MCNC: 0.3 (ref 0–0.5)
BILIRUBIN DIRECT+TOT PNL SERPL-MCNC: 0.3 (ref 0–0.8)
BUN SERPL-MCNC: 10.9 MG/DL (ref 9.8–20.1)
CALCIUM SERPL-MCNC: 9.7 MG/DL (ref 8.4–10.2)
CHLORIDE SERPL-SCNC: 103 MMOL/L (ref 98–107)
CO2 SERPL-SCNC: 27 MMOL/L (ref 23–31)
CREAT SERPL-MCNC: 0.79 MG/DL (ref 0.57–1.11)
GLOBULIN SER-MCNC: 3.5 G/DL (ref 2.4–3.5)
GLUCOSE SERPL-MCNC: 226 MG/DL (ref 82–115)
HEMOLYSIS INTERF INDEX SERPL-ACNC: 9
ICTERIC INTERF INDEX SERPL-ACNC: 1
LIPEMIC INTERF INDEX SERPL-ACNC: 1
POTASSIUM SERPL-SCNC: 5.1 MMOL/L (ref 3.5–5.1)
PROT SERPL-MCNC: 7.3 G/DL (ref 5.8–7.6)
SODIUM SERPL-SCNC: 139 MMOL/L (ref 136–145)

## 2022-04-11 ENCOUNTER — HISTORICAL (OUTPATIENT)
Dept: ADMINISTRATIVE | Facility: HOSPITAL | Age: 72
End: 2022-04-11
Payer: MEDICARE

## 2022-04-27 VITALS
WEIGHT: 226.75 LBS | OXYGEN SATURATION: 97 % | SYSTOLIC BLOOD PRESSURE: 130 MMHG | DIASTOLIC BLOOD PRESSURE: 82 MMHG | HEIGHT: 61 IN | BODY MASS INDEX: 42.81 KG/M2

## 2022-04-30 NOTE — OP NOTE
DATE OF SURGERY:    03/29/2019    SURGEON:  Good Maldonado MD    PREOPERATIVE DIAGNOSES:    1. Left-sided breast cancer status post lumpectomy.  2. Left axillary lipoma.    POSTOPERATIVE DIAGNOSES:    1. Left-sided breast cancer status post lumpectomy.  2. Left axillary lipoma.    PROCEDURES:    1. Oncoplastic breast reduction, left breast.  2. Removal of left axillary lipoma, 10 cm.    INDICATIONS FOR PROCEDURE:  Ms Dodie Brody is a 68-year-old female who presents with history of cancer being treated by Dr. Chloe Rios with lumpectomy.  Her breast tissue is such that the defect will be a need for breast tissue rearrangement and breast reduction, and then she will have radiation and she will need a contralateral procedure down the road.  She presents for oncoplastic breast reduction.  Of note, a lipoma was found in her left axilla which was excised.    ANESTHESIA:  General.    COMPLICATIONS:  None.    PROCEDURE IN DETAIL:  The patient was endotracheally intubated, prepped and draped in the usual sterile fashion.  Please note Dr. Chloe Rios began her portion of the procedure, which was axillary sentinel lymph node dissection.  After this was completed, I began my portion of the procedure.  I elevated superior flaps based on the Wise pattern using a 10 blade scalpel, first incising through the dermis, then through the subcutaneous tissue.  We then undermined the superior flaps all the way up to the pectoralis major muscle.  We then used a 42 mm cookie cutter to cut out the nipple as well as de-epithelialized an inferior pedicle.  Once this was developed, Dr. Rios began her portion of the procedure, which was a lumpectomy.  Please see her separate operative note for complete details.  After this was completed, there was a defect laterally as well as a large lipoma which was encountered in the axilla.  This was excised primarily using Bovie cautery and clamps were used to remove the lipoma in  its entirety.  It was approximately 10 cm.  Once the lipoma was removed, we then removed the residual C-shaped breast tissue as well as skin and sent it to Pathology.  Once the breast reduction was completed, we irrigated out the operative bed with sterile saline solution and created hemostasis using Bovie cautery.  We then closed the superior flaps over the Wise pattern using interrupted 0 Vicryl pops.  We then closed the vertical pattern using interrupted 3-0 Vicryl pops.  The skin was closed using running 2-0 Monocryl.  We then used a 42 mm cookie cutter to cut out the nipple through the superior flap and then the nipple was inset using interrupted 3-0 Monocryl followed by running 3-0 Monocryl.  After the breast reduction was completed, sterile dressing was applied.  There were no complications.  I was scrubbed and present for the entire procedure.        ______________________________  MD VIVEK Matos/  DD:  03/29/2019  Time:  11:41AM  DT:  03/29/2019  Time:  11:53AM  Job #:  [id number]

## 2022-05-04 NOTE — HISTORICAL [facility]
This is a historical note converted from [place]. Formatting and pictures may have been removed.  Please reference [facility] for original formatting and attached multimedia. DATE OF SERVICE: 3/29/2019  ?   SURGEON: Dr. Chloe Rios  ?   ASSIST: None  ?   PREOPERATIVE DIAGNOSIS:  1. Left breast intracystic papillary?carcinoma  2. Left breast ductal carcinoma in situ  ?  POSTOPERATIVE DIAGNOSIS:  1. Left breast intracystic papillary?carcinoma  2. Left breast ductal carcinoma in situ  ?  PROCEDURE:  1. Left breast subareolar injection of isosulfan blue dye  2. Left partial mastectomy/lumpectomy with preop seed localization  3. Left axillary sentinel lymph node biopsy x 2  ?  ANESTHESIA: General  ?  ESTIMATED BLOOD LOSS:  ?  FINDINGS:  1. Left breast tissue with?two seeds,?calcifications,?and?two clips  2. Left axillary sentinel lymph node  ?  SPECIMEN:  1. Left partial mastectomy/lumpectomy margins marked with?sterile inking kit?(Superior - Blue, Inferior - Green, Lateral - Orange, Medial - Red, Posterior - Black, and Anterior - Yellow)  2. Left axillary sentinel lymph node #1, hot and blue  3. Left axillary sentinel lymph node #2, hot and blue  ?  ?  DRAINS: None  ?  COMPLICATIONS: None  ?  PROCEDURE INDICATION:  The surgical complications of either procedure, Dodie Brody is a pleasant 68 year old female who presents with AJCC 8th clinical stage 0 (cTis N0 M0) left breast intracystic papillary carcinoma and ductal carcinoma in situ, low-grade. ER 98.81 % and?CT 99.27 %. The general operative procedure?of lumpectomy was discussed in detail. The surgical complications of either procedure, i.e., pain, bleeding, hematoma, seroma, potential need for additional surgery, and infection were addressed in detail.?The procedure?for sentinel lymph node biopsy was discussed in detail as well.  ?  PROCEDURE DESCRIPTION:  Prior to the operating room the patient was injected with Technetium-99 (TC-99) sulfur colloid by the nuclear  medicine. The patient was then brought to the operating room and placed supine on the operative table. General anesthesia was induced.?3 cc?s of?isosulfan blue dye was injected in the subareolar space and gently messages. The skin of the?Left breast was prepped and draped in standard sterile surgical fashion along with the Left axilla and upper arm. A time-out was completed verifying correct patient, procedure, site, positioning and equipment prior to beginning the procedure.?  ?  The sentinel lymph node biopsy was completed.?An incision was marked using the [employer] device at the lower border of the?Left axilla.?Via this incision the subcutaneous tissue was deepened toward the axilla. Using a hand-held gamma probe ([employer])?the axilla was assessed for a sentinel lymph node. Initial counts prior to excision the lymph node measured 4600s. Blue staining was?noted at this time. The lymph node was excised and the gamma probe was placed next to it which measured 1600. The cavity was reassessed with the hand held gamma probe and found to have a count of 1600s.?Two?additional?lymph nodes were identified and removed. The lymph nodes were sent as specimen sentinel lymph node #1 and?#2.?No further dissection was undertaken.?  ?  The lumpectomy portion of the procedure was started. An?reduction mammoplasty?incision was planned of the Left breast. The incision was planned such that the four seeds may be included into the excision field. The incision was made using a 15-blade. The subcutaneous tissue was deepened using electrocautery. Hemostasis was checked and maintained. The?two seeds was?identified within?the surgical field. The dissection was carried out circumferentially to include the seeds. The dissection was carried down to the pectoralis fascia, leaving the fascia intact.?The specimen was then completed dissected free. The specimen was sent to the breast center, an x-ray film of the specimen was taken and reviewed. It  was confirmed that the?two seeds, two?clips, and lesion were within the specimen. The specimen was then marked using a sterile inking kit. The specimen was then sent to pathology. Hemostasis was again checked and obtained. Irrigation was performed of the cavity.  ?  The axillary incision?was closed with 3-0 Vicryl and running?4-0 Monocryl subcuticular suture. The?reduction mammoplasty was completed by Dr. Thong Maldonado (dictated in a separate note).

## 2022-05-04 NOTE — HISTORICAL [facility]
This is a historical note converted from [employer]. Formatting and pictures may have been removed.  Please reference [facility]omy for original formatting and attached multimedia. DATE OF SERVICE: 5/6/2019  ?   SURGEON: Dr. Chloe Rios  ?   ASSIST: None  ?   PREOPERATIVE DIAGNOSIS:  1. Left breast intracystic papillary?carcinoma  2. Left breast ductal carcinoma in situ  ?  POSTOPERATIVE DIAGNOSIS:  1. Left breast intracystic papillary?carcinoma  2. Left breast ductal carcinoma in situ  ?  PROCEDURE:  1. Left completion mastectomy  ?  ANESTHESIA: General  ?  ESTIMATED BLOOD LOSS: 20 mL  ?  FINDINGS:  1. Left mastectomy  ?  SPECIMEN:  1. Left completion mastectomy  ?  DRAINS: LUTHER drain left chest  ?  COMPLICATIONS: None  ?  PROCEDURE INDICATION:  Dodie Brody is a pleasant 68 year old female who presents with AJCC 8th clinical stage 0 (cTis N0 M0) left breast intracystic papillary carcinoma and ductal carcinoma in situ, low-grade. ER 98.81% and MO 99.27%. She underwent left PM/lumpectomy with oncoplastic reduction on 3/29/2019. Final pathology revealed AJCC 8th ed pathologic anatomic/prognostic stage IA (pTimi pN0(sn) M0) MICRO-INVASIVE CARCINOMA (1 MM) ARISING IN EXTENSIVE, MULTIFOCAL DUCTAL CARCINOMA IN SITU, LOW GRADE. DCIS IS PRESENT IN THE MEDIAL AND SUPERIOR MARGINS. DCIS IS <1.0 MM FROM THE POSTERIOR MARGIN. TWO SENTINEL LYMPH NODES NEGATIVE FOR MALIGNANCY. ER 91.64%, MO 93.79%, and HER2 dheeraj 0 on IHC (negative). She also had a lipoma present within her breast which was also removed. The surgical procedure of?a mastectomy was discussed with her in detail. The?risk and benefits were explained.  ?  PROCEDURE?DESCRIPTION:  The patient was then brought to the operating room and placed supine on the operative table. General anesthesia was induced.?The skin of the?Left breast was prepped and draped in standard sterile surgical fashion along with the Left axilla and upper arm. A time-out was completed verifying correct  patient, procedure, site, positioning and equipment prior to beginning the procedure.?  ?  A?using her prior?skin incision and incision?was made that encompassed the nipple-areola complex on the Left. Flaps were raised in the avascular plane between subcutaneous tissue and breast tissue from the clavicle superiorly, the sternum medially, the anterior rectus sheath inferiorly, and the lateral border of the pectoralis major muscle laterally. Hemostasis was achieved in the flaps. Next, the breast tissue and underlying pectoralis fascia were excised from the pectoralis major muscle, progressing from medial to lateral. At the lateral border of the pectoralis major muscle, the breast tissue was swung laterally and a lateral pedicle identified where breast tissue gave way to fat of axilla. The breast was then completely removed and marked for pathology. The specimen was then sent to pathology. The cavity was irrigated and hemostasis checked.  ?  The?cavity was?closed with interrupted 3-0 Vicryl sutures of the subdermal layer?and 4-0 subcuticular running skin closures.?Exopfin was applied over?the incision. Steri-strips were then applied on top followed by 4x4 gauze.  ?  The?patient was awakened from anesthesia and taken to the postanesthesia care unit in stable condition.  ?  ?   A sriram-chaves drain was placed and secured with a 3-0 Nylon prior to closing the incision.

## 2022-06-02 RX ORDER — SIMVASTATIN 20 MG/1
1 TABLET, FILM COATED ORAL DAILY
COMMUNITY
Start: 2022-01-25 | End: 2022-06-02 | Stop reason: SDUPTHER

## 2022-06-02 RX ORDER — SIMVASTATIN 20 MG/1
20 TABLET, FILM COATED ORAL DAILY
Qty: 90 TABLET | Refills: 0 | Status: SHIPPED | OUTPATIENT
Start: 2022-06-02 | End: 2022-09-02 | Stop reason: SDUPTHER

## 2022-07-13 ENCOUNTER — PATIENT OUTREACH (OUTPATIENT)
Dept: ADMINISTRATIVE | Facility: HOSPITAL | Age: 72
End: 2022-07-13
Payer: MEDICARE

## 2022-07-13 NOTE — PROGRESS NOTES
Population Health Outreach. The following record(s)  below were uploaded for Health Maintenance .      03.12.2020 JULIANE

## 2022-07-20 ENCOUNTER — LAB VISIT (OUTPATIENT)
Dept: LAB | Facility: HOSPITAL | Age: 72
End: 2022-07-20
Attending: FAMILY MEDICINE
Payer: MEDICARE

## 2022-07-20 DIAGNOSIS — I10 HYPERTENSION, UNSPECIFIED TYPE: Primary | ICD-10-CM

## 2022-07-20 DIAGNOSIS — Z87.891 EX-CIGARETTE SMOKER: ICD-10-CM

## 2022-07-20 DIAGNOSIS — Z12.31 SCREENING MAMMOGRAM FOR HIGH-RISK PATIENT: ICD-10-CM

## 2022-07-20 DIAGNOSIS — C50.919 BREAST CANCER: ICD-10-CM

## 2022-07-20 DIAGNOSIS — M85.80 OSTEOPENIA, UNSPECIFIED LOCATION: ICD-10-CM

## 2022-07-20 DIAGNOSIS — Z78.0 POSTMENOPAUSAL: ICD-10-CM

## 2022-07-20 DIAGNOSIS — E78.00 HIGH CHOLESTEROL: ICD-10-CM

## 2022-07-20 DIAGNOSIS — R91.1 LUNG NODULE: ICD-10-CM

## 2022-07-20 DIAGNOSIS — E11.9 DIABETES MELLITUS WITHOUT COMPLICATION: ICD-10-CM

## 2022-07-20 DIAGNOSIS — E55.9 HYPOVITAMINOSIS D: ICD-10-CM

## 2022-07-20 DIAGNOSIS — Z74.09 MOBILITY IMPAIRED: ICD-10-CM

## 2022-07-20 LAB
ALBUMIN SERPL-MCNC: 3.9 GM/DL (ref 3.4–4.8)
ALBUMIN/GLOB SERPL: 1.1 RATIO (ref 1.1–2)
ALP SERPL-CCNC: 63 UNIT/L (ref 40–150)
ALT SERPL-CCNC: 27 UNIT/L (ref 0–55)
APPEARANCE UR: ABNORMAL
AST SERPL-CCNC: 25 UNIT/L (ref 5–34)
BACTERIA #/AREA URNS AUTO: ABNORMAL /HPF
BASOPHILS # BLD AUTO: 0.02 X10(3)/MCL (ref 0–0.2)
BASOPHILS NFR BLD AUTO: 0.3 %
BILIRUB UR QL STRIP.AUTO: NEGATIVE MG/DL
BILIRUBIN DIRECT+TOT PNL SERPL-MCNC: 0.6 MG/DL
BUN SERPL-MCNC: 11.3 MG/DL (ref 9.8–20.1)
CALCIUM SERPL-MCNC: 9.9 MG/DL (ref 8.4–10.2)
CHLORIDE SERPL-SCNC: 106 MMOL/L (ref 98–107)
CHOLEST SERPL-MCNC: 161 MG/DL
CHOLEST/HDLC SERPL: 4 {RATIO} (ref 0–5)
CO2 SERPL-SCNC: 23 MMOL/L (ref 23–31)
COLOR UR AUTO: YELLOW
CREAT SERPL-MCNC: 0.76 MG/DL (ref 0.55–1.02)
CREAT UR-MCNC: 85.5 MG/DL (ref 47–110)
EOSINOPHIL # BLD AUTO: 0.08 X10(3)/MCL (ref 0–0.9)
EOSINOPHIL NFR BLD AUTO: 1 %
ERYTHROCYTE [DISTWIDTH] IN BLOOD BY AUTOMATED COUNT: 13.7 % (ref 11.5–17)
EST. AVERAGE GLUCOSE BLD GHB EST-MCNC: 180 MG/DL
GLOBULIN SER-MCNC: 3.5 GM/DL (ref 2.4–3.5)
GLUCOSE SERPL-MCNC: 185 MG/DL (ref 82–115)
GLUCOSE UR QL STRIP.AUTO: NEGATIVE MG/DL
HBA1C MFR BLD: 7.9 %
HCT VFR BLD AUTO: 41.9 % (ref 37–47)
HDLC SERPL-MCNC: 43 MG/DL (ref 35–60)
HGB BLD-MCNC: 13.8 GM/DL (ref 12–16)
IMM GRANULOCYTES # BLD AUTO: 0.02 X10(3)/MCL (ref 0–0.04)
IMM GRANULOCYTES NFR BLD AUTO: 0.3 %
KETONES UR QL STRIP.AUTO: NEGATIVE MG/DL
LDLC SERPL CALC-MCNC: 95 MG/DL (ref 50–140)
LEUKOCYTE ESTERASE UR QL STRIP.AUTO: ABNORMAL UNIT/L
LYMPHOCYTES # BLD AUTO: 2.24 X10(3)/MCL (ref 0.6–4.6)
LYMPHOCYTES NFR BLD AUTO: 28.6 %
MCH RBC QN AUTO: 28.8 PG (ref 27–31)
MCHC RBC AUTO-ENTMCNC: 32.9 MG/DL (ref 33–36)
MCV RBC AUTO: 87.3 FL (ref 80–94)
MICROALBUMIN UR-MCNC: 7.6 UG/ML
MICROALBUMIN/CREAT RATIO PNL UR: 8.9 MG/GM CR (ref 0–30)
MONOCYTES # BLD AUTO: 0.53 X10(3)/MCL (ref 0.1–1.3)
MONOCYTES NFR BLD AUTO: 6.8 %
MUCOUS THREADS URNS QL MICRO: ABNORMAL /LPF
NEUTROPHILS # BLD AUTO: 4.9 X10(3)/MCL (ref 2.1–9.2)
NEUTROPHILS NFR BLD AUTO: 63 %
NITRITE UR QL STRIP.AUTO: NEGATIVE
NRBC BLD AUTO-RTO: 0 %
PH UR STRIP.AUTO: 5.5 [PH]
PLATELET # BLD AUTO: 236 X10(3)/MCL (ref 130–400)
PMV BLD AUTO: 10.7 FL (ref 7.4–10.4)
POTASSIUM SERPL-SCNC: 4.5 MMOL/L (ref 3.5–5.1)
PROT SERPL-MCNC: 7.4 GM/DL (ref 5.8–7.6)
PROT UR QL STRIP.AUTO: NEGATIVE MG/DL
RBC # BLD AUTO: 4.8 X10(6)/MCL (ref 4.2–5.4)
RBC #/AREA URNS AUTO: ABNORMAL /HPF
RBC UR QL AUTO: NEGATIVE UNIT/L
SODIUM SERPL-SCNC: 142 MMOL/L (ref 136–145)
SP GR UR STRIP.AUTO: 1.02
SQUAMOUS #/AREA URNS AUTO: ABNORMAL /HPF
TRIGL SERPL-MCNC: 113 MG/DL (ref 37–140)
TSH SERPL-ACNC: 1.87 UIU/ML (ref 0.35–4.94)
UROBILINOGEN UR STRIP-ACNC: 0.2 MG/DL
VLDLC SERPL CALC-MCNC: 23 MG/DL
WBC # SPEC AUTO: 7.8 X10(3)/MCL (ref 4.5–11.5)
WBC #/AREA URNS AUTO: ABNORMAL /HPF

## 2022-07-20 PROCEDURE — 36415 COLL VENOUS BLD VENIPUNCTURE: CPT

## 2022-07-20 PROCEDURE — 83036 HEMOGLOBIN GLYCOSYLATED A1C: CPT

## 2022-07-20 PROCEDURE — 87077 CULTURE AEROBIC IDENTIFY: CPT

## 2022-07-20 PROCEDURE — 81001 URINALYSIS AUTO W/SCOPE: CPT

## 2022-07-20 PROCEDURE — 84443 ASSAY THYROID STIM HORMONE: CPT

## 2022-07-20 PROCEDURE — 80053 COMPREHEN METABOLIC PANEL: CPT

## 2022-07-20 PROCEDURE — 85025 COMPLETE CBC W/AUTO DIFF WBC: CPT

## 2022-07-20 PROCEDURE — 80061 LIPID PANEL: CPT

## 2022-07-20 PROCEDURE — 82043 UR ALBUMIN QUANTITATIVE: CPT

## 2022-07-21 NOTE — PROGRESS NOTES
"These results have been reviewed by your healthcare team.  You are advised to continue your current medications.    Labs are within normal limits except  >urine culture shows possible contamination.  If she is having uti symptoms, we will need to collect another urine    Please keep in mind that results may often be outside of the "normal" range while still being acceptable for your diagnosis and your plan of care.  You will be contacted if your results require a change in your medical treatment. If you wish to discuss your results, you will be required to schedule an appointment.   "

## 2022-07-21 NOTE — PROGRESS NOTES
"These results have been reviewed by your healthcare team.  You are advised to continue your current medications.    Labs are within normal results except  >UA shows concern for infection but may be contaminant. Is she having any uti symptoms? Urine culture is pending. Encourage fluids. Will call with results when available  >A1c is 7.9. up from previous. Confirm she is taking metformin 850 bid.  If yes, we may need to increase.  Follow dmii diet.     Can discuss more at upcoming appointment      Please keep in mind that results may often be outside of the "normal" range while still being acceptable for your diagnosis and your plan of care.  You will be contacted if your results require a change in your medical treatment. If you wish to discuss your results, you will be required to schedule an appointment.   "

## 2022-07-22 LAB — BACTERIA UR CULT: ABNORMAL

## 2022-07-25 ENCOUNTER — OFFICE VISIT (OUTPATIENT)
Dept: FAMILY MEDICINE | Facility: CLINIC | Age: 72
End: 2022-07-25
Payer: MEDICARE

## 2022-07-25 VITALS
TEMPERATURE: 98 F | RESPIRATION RATE: 16 BRPM | OXYGEN SATURATION: 98 % | BODY MASS INDEX: 42.84 KG/M2 | WEIGHT: 226.88 LBS | HEART RATE: 82 BPM | DIASTOLIC BLOOD PRESSURE: 86 MMHG | SYSTOLIC BLOOD PRESSURE: 128 MMHG

## 2022-07-25 DIAGNOSIS — Z28.21 TETANUS, DIPHTHERIA, AND ACELLULAR PERTUSSIS (TDAP) VACCINATION DECLINED: ICD-10-CM

## 2022-07-25 DIAGNOSIS — R91.8 LUNG MASS: ICD-10-CM

## 2022-07-25 DIAGNOSIS — Z78.0 POSTMENOPAUSAL: ICD-10-CM

## 2022-07-25 DIAGNOSIS — C50.919 MALIGNANT NEOPLASM OF FEMALE BREAST, UNSPECIFIED ESTROGEN RECEPTOR STATUS, UNSPECIFIED LATERALITY, UNSPECIFIED SITE OF BREAST: ICD-10-CM

## 2022-07-25 DIAGNOSIS — E78.00 HYPERCHOLESTEROLEMIA: ICD-10-CM

## 2022-07-25 DIAGNOSIS — Z74.09 IMPAIRED MOBILITY: ICD-10-CM

## 2022-07-25 DIAGNOSIS — I10 HYPERTENSION, UNSPECIFIED TYPE: ICD-10-CM

## 2022-07-25 DIAGNOSIS — Z87.891 EX-CIGARETTE SMOKER: ICD-10-CM

## 2022-07-25 DIAGNOSIS — Z00.00 MEDICARE ANNUAL WELLNESS VISIT, SUBSEQUENT: ICD-10-CM

## 2022-07-25 DIAGNOSIS — E55.9 VITAMIN D DEFICIENCY: ICD-10-CM

## 2022-07-25 DIAGNOSIS — Z12.31 BREAST CANCER SCREENING BY MAMMOGRAM: ICD-10-CM

## 2022-07-25 DIAGNOSIS — E11.65 TYPE 2 DIABETES MELLITUS WITH HYPERGLYCEMIA, WITHOUT LONG-TERM CURRENT USE OF INSULIN: Primary | ICD-10-CM

## 2022-07-25 DIAGNOSIS — M85.80 OSTEOPENIA, UNSPECIFIED LOCATION: ICD-10-CM

## 2022-07-25 PROBLEM — E11.9 DIABETES MELLITUS: Status: ACTIVE | Noted: 2022-07-25

## 2022-07-25 PROCEDURE — 99214 PR OFFICE/OUTPT VISIT, EST, LEVL IV, 30-39 MIN: ICD-10-PCS | Mod: ,,, | Performed by: FAMILY MEDICINE

## 2022-07-25 PROCEDURE — 99214 OFFICE O/P EST MOD 30 MIN: CPT | Mod: ,,, | Performed by: FAMILY MEDICINE

## 2022-07-25 RX ORDER — METFORMIN HYDROCHLORIDE 850 MG/1
TABLET ORAL
Qty: 270 TABLET | Refills: 3 | Status: SHIPPED | OUTPATIENT
Start: 2022-07-25 | End: 2023-01-26

## 2022-07-25 RX ORDER — METFORMIN HYDROCHLORIDE 850 MG/1
850 TABLET ORAL
COMMUNITY
Start: 2022-01-25 | End: 2022-07-25 | Stop reason: SDUPTHER

## 2022-07-25 RX ORDER — ENALAPRIL MALEATE 10 MG/1
10 TABLET ORAL
COMMUNITY
Start: 2022-01-25 | End: 2023-01-20

## 2022-07-25 RX ORDER — FERROUS SULFATE, DRIED 160(50) MG
1 TABLET, EXTENDED RELEASE ORAL
COMMUNITY

## 2022-07-25 RX ORDER — ENALAPRIL MALEATE 20 MG/1
TABLET ORAL
COMMUNITY
Start: 2022-01-25 | End: 2023-03-03 | Stop reason: SDUPTHER

## 2022-07-28 NOTE — ASSESSMENT & PLAN NOTE
Seen on ct chest lung cancer screening. Stable per last 9/2021. Current nonsmoker.  Repeat ct chest ordered.

## 2022-07-28 NOTE — ASSESSMENT & PLAN NOTE
Lab Results   Component Value Date    HGBA1C 7.9 (H) 07/20/2022     Urine micro 1/2022  Foot exam today  Eye exam with dr. alas    On metformin 850mg bid.  On acei and statin.  Recent a1c is up from previous. Get back on track with diet. Continue to monitor cbgs.   Will increase metformin to 850mg tid. Contact clinic for concerns.

## 2022-08-12 ENCOUNTER — TELEPHONE (OUTPATIENT)
Dept: FAMILY MEDICINE | Facility: CLINIC | Age: 72
End: 2022-08-12
Payer: MEDICARE

## 2022-08-12 NOTE — TELEPHONE ENCOUNTER
----- Message from Maya Johnson sent at 8/12/2022  9:37 AM CDT -----  Regarding: pt sugar log  Type:  b/p log    Who Called: pt  Would the patient rather a call back or a response via [facility]? C/b  Best Call Back Number: [phone]  Additional Information: pt's sugar log, pt did not take the metformin 3x day pt only took the medication 2 x day for the 3 weeks  Pt started the Golo 2 weeks ago    7/27    164  7/28    175  8/1      163    8/3     177  8/5     144  8/8     122    8/9     121  8/10   112

## 2022-09-02 RX ORDER — SIMVASTATIN 20 MG/1
20 TABLET, FILM COATED ORAL DAILY
Qty: 90 TABLET | Refills: 0 | Status: SHIPPED | OUTPATIENT
Start: 2022-09-02 | End: 2022-12-05

## 2022-09-06 NOTE — TELEPHONE ENCOUNTER
----- Message from Ayesha Boyd sent at 9/2/2022  8:23 AM CDT -----  Regarding: Med Refill  .Type:  RX Refill Request    Who Called: Pt  Refill or New Rx:Refill  RX Name and Strength:simvastatin (ZOCOR) 20 MG tablet  How is the patient currently taking it? (ex. 1XDay):1xday  Is this a 30 day or 90 day RX:90  Preferred Pharmacy with phone number:[facility]   Local or Mail Order:Local  Ordering Provider:Shirley  Would the patient rather a call back or a response via [facility]? Call back  Best Call Back Number:[phone]  Additional Information:          Ear Wedge Repair Text: Due to exposed cartilage and to restore structure and function of the ear, a wedge excision was completed by carrying down an excision through the full thickness of the ear and cartilage with an inward facing Burow's triangle. The wound was then closed in a layered fashion.

## 2022-09-12 ENCOUNTER — HOSPITAL ENCOUNTER (OUTPATIENT)
Dept: RADIOLOGY | Facility: HOSPITAL | Age: 72
Discharge: HOME | End: 2022-09-12
Attending: FAMILY MEDICINE
Payer: MEDICARE

## 2022-09-12 DIAGNOSIS — Z87.891 EX-CIGARETTE SMOKER: ICD-10-CM

## 2022-09-12 PROCEDURE — 71271 CT THORAX LUNG CANCER SCR C-: CPT | Mod: TC

## 2022-10-24 PROBLEM — Z00.00 MEDICARE ANNUAL WELLNESS VISIT, SUBSEQUENT: Status: RESOLVED | Noted: 2022-07-25 | Resolved: 2022-10-24

## 2022-11-29 ENCOUNTER — TELEPHONE (OUTPATIENT)
Dept: FAMILY MEDICINE | Facility: CLINIC | Age: 72
End: 2022-11-29
Payer: MEDICARE

## 2022-11-29 DIAGNOSIS — Z12.31 OTHER SCREENING MAMMOGRAM: Primary | ICD-10-CM

## 2022-11-29 NOTE — TELEPHONE ENCOUNTER
----- Message from Ayesha Boyd sent at 11/28/2022  8:32 AM CST -----  Regarding: Mammo Orders  .Type:  Needs Medical Advice    Who Called: Pt  Symptoms (please be specific):    How long has patient had these symptoms:    Pharmacy name and phone #:    Would the patient rather a call back or a response via [facility]? Call back  Best Call Back Number: [phone]  Additional Information: Requesting mammo orders sent to [facility] in [place]

## 2022-11-29 NOTE — TELEPHONE ENCOUNTER
Order placed in chart and faxed to [facility] today.Patient notified and voiced understanding. Manjula

## 2022-12-20 ENCOUNTER — DOCUMENTATION ONLY (OUTPATIENT)
Dept: INTERNAL MEDICINE | Facility: CLINIC | Age: 72
End: 2022-12-20
Payer: MEDICARE

## 2023-01-19 ENCOUNTER — LAB VISIT (OUTPATIENT)
Dept: LAB | Facility: HOSPITAL | Age: 73
End: 2023-01-19
Attending: FAMILY MEDICINE
Payer: MEDICARE

## 2023-01-19 DIAGNOSIS — Z12.31 BREAST CANCER SCREENING BY MAMMOGRAM: ICD-10-CM

## 2023-01-19 DIAGNOSIS — Z87.891 EX-CIGARETTE SMOKER: ICD-10-CM

## 2023-01-19 DIAGNOSIS — Z00.00 MEDICARE ANNUAL WELLNESS VISIT, SUBSEQUENT: ICD-10-CM

## 2023-01-19 DIAGNOSIS — E11.65 TYPE 2 DIABETES MELLITUS WITH HYPERGLYCEMIA, WITHOUT LONG-TERM CURRENT USE OF INSULIN: ICD-10-CM

## 2023-01-19 DIAGNOSIS — Z78.0 POSTMENOPAUSAL: ICD-10-CM

## 2023-01-19 DIAGNOSIS — E78.00 HYPERCHOLESTEROLEMIA: ICD-10-CM

## 2023-01-19 DIAGNOSIS — M85.80 OSTEOPENIA, UNSPECIFIED LOCATION: ICD-10-CM

## 2023-01-19 DIAGNOSIS — R91.8 LUNG MASS: ICD-10-CM

## 2023-01-19 DIAGNOSIS — C50.919 MALIGNANT NEOPLASM OF FEMALE BREAST, UNSPECIFIED ESTROGEN RECEPTOR STATUS, UNSPECIFIED LATERALITY, UNSPECIFIED SITE OF BREAST: ICD-10-CM

## 2023-01-19 DIAGNOSIS — E55.9 VITAMIN D DEFICIENCY: ICD-10-CM

## 2023-01-19 DIAGNOSIS — Z74.09 IMPAIRED MOBILITY: ICD-10-CM

## 2023-01-19 DIAGNOSIS — I10 HYPERTENSION, UNSPECIFIED TYPE: ICD-10-CM

## 2023-01-19 DIAGNOSIS — Z28.21 TETANUS, DIPHTHERIA, AND ACELLULAR PERTUSSIS (TDAP) VACCINATION DECLINED: ICD-10-CM

## 2023-01-19 LAB
ALBUMIN SERPL-MCNC: 4.1 G/DL (ref 3.4–4.8)
ALBUMIN/GLOB SERPL: 1.1 RATIO (ref 1.1–2)
ALP SERPL-CCNC: 59 UNIT/L (ref 40–150)
ALT SERPL-CCNC: 12 UNIT/L (ref 0–55)
AST SERPL-CCNC: 14 UNIT/L (ref 5–34)
BASOPHILS # BLD AUTO: 0.03 X10(3)/MCL (ref 0–0.2)
BASOPHILS NFR BLD AUTO: 0.5 %
BILIRUBIN DIRECT+TOT PNL SERPL-MCNC: 0.7 MG/DL
BUN SERPL-MCNC: 15.3 MG/DL (ref 9.8–20.1)
CALCIUM SERPL-MCNC: 10.3 MG/DL (ref 8.4–10.2)
CHLORIDE SERPL-SCNC: 107 MMOL/L (ref 98–107)
CHOLEST SERPL-MCNC: 159 MG/DL
CHOLEST/HDLC SERPL: 3 {RATIO} (ref 0–5)
CO2 SERPL-SCNC: 25 MMOL/L (ref 23–31)
CREAT SERPL-MCNC: 0.75 MG/DL (ref 0.55–1.02)
EOSINOPHIL # BLD AUTO: 0.09 X10(3)/MCL (ref 0–0.9)
EOSINOPHIL NFR BLD AUTO: 1.4 %
ERYTHROCYTE [DISTWIDTH] IN BLOOD BY AUTOMATED COUNT: 13.7 % (ref 11.5–17)
EST. AVERAGE GLUCOSE BLD GHB EST-MCNC: 119.8 MG/DL
GFR SERPLBLD CREATININE-BSD FMLA CKD-EPI: >60 MLS/MIN/1.73/M2
GLOBULIN SER-MCNC: 3.6 GM/DL (ref 2.4–3.5)
GLUCOSE SERPL-MCNC: 121 MG/DL (ref 82–115)
HBA1C MFR BLD: 5.8 %
HCT VFR BLD AUTO: 42.7 % (ref 37–47)
HCV AB SERPL QL IA: NONREACTIVE
HDLC SERPL-MCNC: 47 MG/DL (ref 35–60)
HGB BLD-MCNC: 13.7 GM/DL (ref 12–16)
IMM GRANULOCYTES # BLD AUTO: 0.01 X10(3)/MCL (ref 0–0.04)
IMM GRANULOCYTES NFR BLD AUTO: 0.2 %
LDLC SERPL CALC-MCNC: 90 MG/DL (ref 50–140)
LYMPHOCYTES # BLD AUTO: 2.54 X10(3)/MCL (ref 0.6–4.6)
LYMPHOCYTES NFR BLD AUTO: 39.9 %
MCH RBC QN AUTO: 28.1 PG
MCHC RBC AUTO-ENTMCNC: 32.1 MG/DL (ref 33–36)
MCV RBC AUTO: 87.7 FL (ref 80–94)
MONOCYTES # BLD AUTO: 0.43 X10(3)/MCL (ref 0.1–1.3)
MONOCYTES NFR BLD AUTO: 6.8 %
NEUTROPHILS # BLD AUTO: 3.27 X10(3)/MCL (ref 2.1–9.2)
NEUTROPHILS NFR BLD AUTO: 51.2 %
NRBC BLD AUTO-RTO: 0 %
PLATELET # BLD AUTO: 229 X10(3)/MCL (ref 130–400)
PMV BLD AUTO: 10 FL (ref 7.4–10.4)
POTASSIUM SERPL-SCNC: 4.6 MMOL/L (ref 3.5–5.1)
PROT SERPL-MCNC: 7.7 GM/DL (ref 5.8–7.6)
RBC # BLD AUTO: 4.87 X10(6)/MCL (ref 4.2–5.4)
SODIUM SERPL-SCNC: 142 MMOL/L (ref 136–145)
TRIGL SERPL-MCNC: 109 MG/DL (ref 37–140)
TSH SERPL-ACNC: 2.1 UIU/ML (ref 0.35–4.94)
VLDLC SERPL CALC-MCNC: 22 MG/DL
WBC # SPEC AUTO: 6.4 X10(3)/MCL (ref 4.5–11.5)

## 2023-01-19 PROCEDURE — 83036 HEMOGLOBIN GLYCOSYLATED A1C: CPT

## 2023-01-19 PROCEDURE — 85025 COMPLETE CBC W/AUTO DIFF WBC: CPT

## 2023-01-19 PROCEDURE — 84443 ASSAY THYROID STIM HORMONE: CPT

## 2023-01-19 PROCEDURE — 80061 LIPID PANEL: CPT

## 2023-01-19 PROCEDURE — 36415 COLL VENOUS BLD VENIPUNCTURE: CPT

## 2023-01-19 PROCEDURE — 86803 HEPATITIS C AB TEST: CPT

## 2023-01-19 PROCEDURE — 80053 COMPREHEN METABOLIC PANEL: CPT

## 2023-01-26 ENCOUNTER — OFFICE VISIT (OUTPATIENT)
Dept: FAMILY MEDICINE | Facility: CLINIC | Age: 73
End: 2023-01-26
Payer: MEDICARE

## 2023-01-26 ENCOUNTER — TELEPHONE (OUTPATIENT)
Dept: FAMILY MEDICINE | Facility: CLINIC | Age: 73
End: 2023-01-26

## 2023-01-26 VITALS
SYSTOLIC BLOOD PRESSURE: 128 MMHG | BODY MASS INDEX: 38.95 KG/M2 | HEIGHT: 61 IN | TEMPERATURE: 98 F | HEART RATE: 69 BPM | RESPIRATION RATE: 16 BRPM | OXYGEN SATURATION: 98 % | DIASTOLIC BLOOD PRESSURE: 74 MMHG | WEIGHT: 206.31 LBS

## 2023-01-26 DIAGNOSIS — I15.2 HYPERTENSION ASSOCIATED WITH DIABETES: ICD-10-CM

## 2023-01-26 DIAGNOSIS — Z85.3 HISTORY OF BREAST CANCER: ICD-10-CM

## 2023-01-26 DIAGNOSIS — E11.59 HYPERTENSION ASSOCIATED WITH DIABETES: ICD-10-CM

## 2023-01-26 DIAGNOSIS — Z74.09 IMPAIRED MOBILITY: ICD-10-CM

## 2023-01-26 DIAGNOSIS — E11.69 HYPERLIPIDEMIA ASSOCIATED WITH TYPE 2 DIABETES MELLITUS: ICD-10-CM

## 2023-01-26 DIAGNOSIS — Z12.31 BREAST CANCER SCREENING BY MAMMOGRAM: ICD-10-CM

## 2023-01-26 DIAGNOSIS — E66.01 CLASS 2 SEVERE OBESITY DUE TO EXCESS CALORIES WITH SERIOUS COMORBIDITY AND BODY MASS INDEX (BMI) OF 38.0 TO 38.9 IN ADULT: ICD-10-CM

## 2023-01-26 DIAGNOSIS — Z87.891 EX-CIGARETTE SMOKER: ICD-10-CM

## 2023-01-26 DIAGNOSIS — M85.80 OSTEOPENIA, UNSPECIFIED LOCATION: ICD-10-CM

## 2023-01-26 DIAGNOSIS — F17.210 NICOTINE DEPENDENCE, CIGARETTES, UNCOMPLICATED: ICD-10-CM

## 2023-01-26 DIAGNOSIS — E78.5 HYPERLIPIDEMIA ASSOCIATED WITH TYPE 2 DIABETES MELLITUS: ICD-10-CM

## 2023-01-26 DIAGNOSIS — Z28.21 TETANUS, DIPHTHERIA, AND ACELLULAR PERTUSSIS (TDAP) VACCINATION DECLINED: ICD-10-CM

## 2023-01-26 DIAGNOSIS — Z00.00 MEDICARE ANNUAL WELLNESS VISIT, SUBSEQUENT: Primary | ICD-10-CM

## 2023-01-26 DIAGNOSIS — Z71.89 ADVANCED CARE PLANNING/COUNSELING DISCUSSION: ICD-10-CM

## 2023-01-26 DIAGNOSIS — E11.65 TYPE 2 DIABETES MELLITUS WITH HYPERGLYCEMIA, WITHOUT LONG-TERM CURRENT USE OF INSULIN: ICD-10-CM

## 2023-01-26 DIAGNOSIS — Z78.0 POSTMENOPAUSAL: ICD-10-CM

## 2023-01-26 DIAGNOSIS — E55.9 VITAMIN D DEFICIENCY: ICD-10-CM

## 2023-01-26 PROBLEM — E66.812 CLASS 2 SEVERE OBESITY DUE TO EXCESS CALORIES WITH SERIOUS COMORBIDITY AND BODY MASS INDEX (BMI) OF 38.0 TO 38.9 IN ADULT: Status: ACTIVE | Noted: 2022-07-25

## 2023-01-26 PROCEDURE — G0439 PR MEDICARE ANNUAL WELLNESS SUBSEQUENT VISIT: ICD-10-PCS | Mod: ,,, | Performed by: FAMILY MEDICINE

## 2023-01-26 PROCEDURE — G0439 PPPS, SUBSEQ VISIT: HCPCS | Mod: ,,, | Performed by: FAMILY MEDICINE

## 2023-01-26 RX ORDER — ENALAPRIL MALEATE 10 MG/1
TABLET ORAL
COMMUNITY
Start: 2023-01-15 | End: 2023-03-15 | Stop reason: SDUPTHER

## 2023-01-26 RX ORDER — METFORMIN HYDROCHLORIDE 850 MG/1
850 TABLET ORAL
Qty: 90 TABLET | Refills: 3 | Status: SHIPPED | OUTPATIENT
Start: 2023-01-26 | End: 2023-06-02 | Stop reason: SDUPTHER

## 2023-01-26 RX ORDER — METFORMIN HYDROCHLORIDE 850 MG/1
850 TABLET ORAL
Qty: 90 TABLET | Refills: 3 | Status: SHIPPED | OUTPATIENT
Start: 2023-01-26 | End: 2023-01-26

## 2023-01-26 NOTE — TELEPHONE ENCOUNTER
----- Message from Maisha Irving LPN sent at 1/26/2023  1:26 PM CST -----  Regarding: FW: call back  They want to confirm her dose. It was 2 tabs po in am and 1 tab po at night that she was on. The refill sent in today was called in for one tab po daily. Please advise. Thanks.   ----- Message -----  From: Loni Mares  Sent: 1/26/2023  11:20 AM CST  To: Shirley [facility] Staff  Subject: call back                                        .Type:  Needs Medical Advice    [facility]   Symptoms (please be specific):    How long has patient had these symptoms:    Pharmacy name and phone #:    Would the patient rather a call back or a response via [facility]? Call back   Best Call Back Number: [phone]  Additional Information: [facility] needs some more clarification on the pt's metformin

## 2023-01-26 NOTE — ASSESSMENT & PLAN NOTE
Lab Results   Component Value Date    HGBA1C 5.8 01/19/2023     Urine micro 1/2023  Foot exam 7/2022  Eye exam with dr. alas    On metformin 850 mg BID. Will decrease to qday.  On acei and statin.  Continue to follow dmii diet. Continue to monitor cbgs.   Contact clinic for concerns.

## 2023-01-26 NOTE — TELEPHONE ENCOUNTER
Her rx is for once daily.  At her visit today she told me she had been taking it bid.  Her a1c was 5.8. so with that and her weight loss, we are decreasing to metformin 850mg po qday.

## 2023-01-26 NOTE — ASSESSMENT & PLAN NOTE
Previously done labs reviewed with patient at time of appointment today  Mammogram   1/2022  DEXA  1/2022  No  Longer does pap due to hyst  Cologuard 3/2020    Advanced care planning discussed and paperwork given

## 2023-01-26 NOTE — PROGRESS NOTES
Patient ID: [id number]     Chief Complaint: Medicare AWV (Wellness/Patient has lab results in chart to be reviewed )      HPI:     Dodie Brody is a 72 y.o. female here today for a Medicare Wellness. No other complaints today.     presents to the clinic unaccompanied for her wellness visit.  She did labs prior to her appointment and it was reviewed with patient at time of visit today    Has started golo.  Has lost 20# since lov. She has diabetes. Currently she is on metformin 850mg 2 tab PO qAm since last lab 7/2021. She is trying her best to follow a diabetic diet. started weight watchers. Her last foot exam 7/2022. Her eye exams are done by Dr. Huertas 3/2023. her last a1c 5.8 1/2023.  urine micro 1/2023. went to [facility] - ordered inserts but could not afford.      The patient was diagnosed with breast cancer per her screening mammogram in March 2019. This was abnormal and resulted in a biopsy, subsequent lumpectomy of her left breast and then left mastectomy on May 6, 2019 with Dr. Purdy. She reports she will not need chemotherapy or radiation. last visit 8/2020. last mammogram 1/2021. she does these at [facility] [place]. has appt 1/27/22 for mmg. has appt with dr. purdy 2/2022     She has hypertension. The patient is on enalapril 20mg in am +10 mg in pm for a total of 30 mg daily. she has a bp machine at home. She is taking her medications as prescribed. she fills this at [facility]. She declines asa. She also has hyperlipidemia and is on simvastatin 20 mg. [facility] due to cost. She is requesting refills     She has low vitamin D3 1000IU and supplements over-the-counter. Was wnl 1/2022. Declines lab because medicare will not cover     She is ALLERGIC to Norvasc and tylenol. completed covid series and booster.      She is a former smoker. quit >17 years ago. Had ct lung cancer screening done 8/2018, 8/2019, 8/2020, 9/2021, 7/2022 which showed stability of lung nodules and recommended repeat in  1year.     She declines shingles and tetanus and pneumonia vaccination. She had cologuard 3/2020 it was negative- a copy of this is in her chart. She has had a hysterectomy in her late 20s for noncancerous reasons and therefore no longer does pap smears. dexa 1/2020 showed osteopenia- taking her vitamin d and has not been taking calcium. repeat dexa done 1/2022.      she is mobility impaired and has tag. cannot walk more than 200ft without stopping to rest      History reviewed. No pertinent surgical history.    Review of patient's allergies indicates:   Allergen Reactions    Acetaminophen      Other reaction(s): rash    Amlodipine      Other reaction(s): muscle cramps       No outpatient medications have been marked as taking for the 1/26/23 encounter (Office Visit) with Cherri Watson MD.       Social History     Socioeconomic History    Marital status:    Tobacco Use    Smoking status: Never    Smokeless tobacco: Never   Substance and Sexual Activity    Alcohol use: Not Currently        History reviewed. No pertinent family history.     Patient Care Team:  Cherri Watson MD as PCP - General (Family Medicine)  MD Chloe Mcmahon MD as Consulting Physician (Breast Surgery)       Subjective:     Review of Systems   Constitutional: Negative.    HENT: Negative.     Eyes: Negative.    Respiratory: Negative.     Cardiovascular: Negative.    Gastrointestinal: Negative.    Genitourinary: Negative.    Musculoskeletal: Negative.    Skin: Negative.    Neurological: Negative.    Endo/Heme/Allergies: Negative.    Psychiatric/Behavioral: Negative.         Patient Reported Health Risk Assessment  What [age] your [age] 70-79  Are you male or female?: Female  During the past four weeks, how much have you been bothered by emotional problems such as feeling anxious, depressed, irritable, sad, or downhearted and blue?: Not at all  During the past five weeks, has your physical and/or emotional health  limited your social activities with family, friends, neighbors, or groups?: Not at all  During the past four weeks, how much bodily pain have you generally had?: No pain  During the past four weeks, was someone available to help if you needed and wanted help?: Yes, as much as I wanted  During the past four weeks, what was the hardest physical activity you could do for at least two minutes?: Light  Can you get to places out of walking distance without help?  (For example, can you travel alone on buses or taxis, or drive your own car?): Yes  Can you go shopping for groceries or clothes without someone's help?: Yes  Can you prepare your own meals?: Yes  Can you do your own housework without help?: Yes  Because of any health problems, do you need the help of another person with your personal care needs such as eating, bathing, dressing, or getting around the house?: No  Can you handle your own money without help?: Yes  During the past four weeks, how would you rate your health in general?: Good  How have things been going for you during the past four weeks?: Good and bad parts about equal  Are you having difficulties driving your car?: No  Do you always fasten your seat belt when you are in a car?: Yes, usually  How often in the past four weeks have you been bothered by falling or dizzy when standing up?: Never  How often in the past four weeks have you been bothered by sexual problems?: Never  How often in the past four weeks have you been bothered by trouble eating well?: Never  How often in the past four weeks have you been bothered by teeth or denture problems?: Never  How often in the past four weeks have you been bothered with problems using the telephone?: Never  How often in the past four weeks have you been bothered by tiredness or fatigue?: Never  Have you fallen two or more times in the past year?: No  Are you afraid of falling?: No  Are you a smoker?: No  During the past four weeks, how many drinks of wine,  "beer, or other alcoholic beverages did you have?: No alcohol at all  Do you exercise for about 20 minutes three or more days a week?: No, I usually do not exercise this much  Have you been given any information to help you with hazards in your house that might hurt you?: No  Have you been given any information to help you with keeping track of your medications?: No  How often do you have trouble taking medicines the way you've been told to take them?: I always take them as prescribed  How confident are you that you can control and manage most of your health problems?: Very confident  What is your race? (Check all that apply.):     Objective:     /74 (BP Location: Right arm)   Pulse 69   Temp 98.2 °F (36.8 °C) (Temporal)   Resp 16   Ht 5' 1" (1.549 m)   Wt 93.6 kg (206 lb 4.8 oz)   SpO2 98%   BMI 38.98 kg/m²     Physical Exam  Vitals and nursing note reviewed.   Constitutional:       Appearance: Normal appearance. She is obese.   HENT:      Head: Normocephalic and atraumatic.      Nose: Nose normal.      Mouth/Throat:      Mouth: Mucous membranes are moist.      Pharynx: Oropharynx is clear.   Eyes:      Extraocular Movements: Extraocular movements intact.   Cardiovascular:      Rate and Rhythm: Normal rate and regular rhythm.      Pulses: Normal pulses.      Heart sounds: Normal heart sounds.   Pulmonary:      Effort: Pulmonary effort is normal.      Breath sounds: Normal breath sounds.   Musculoskeletal:         General: Normal range of motion.      Cervical back: Normal range of motion.   Skin:     General: Skin is warm and dry.   Neurological:      General: No focal deficit present.      Mental Status: She is alert and oriented to person, place, and time. Mental status is at baseline.   Psychiatric:         Mood and Affect: Mood normal.         No flowsheet data found.  Fall Risk Assessment - Outpatient 1/26/2023 7/25/2022   Mobility Status Ambulatory Ambulatory   Number of falls 0 0 "   Identified as fall risk 0 0           Depression Screening  Over the past two weeks, has the patient felt down, depressed, or hopeless?: No  Over the past two weeks, has the patient felt little interest or pleasure in doing things?: No  Functional Ability/Safety Screening  Was the patient's timed Up & Go test unsteady or longer than 30 seconds?: No  Does the patient need help with phone, transportation, shopping, preparing meals, housework, laundry, meds, or managing money?: No  Does the patient's home have rugs in the hallway, lack grab bars in the bathroom, lack handrails on the stairs or have poor lighting?: No  Have you noticed any hearing difficulties?: No  Cognitive Function (Assessed through direct observation with due consideration of information obtained by way of patient reports and/or concerns raised by family, friends, caretakers, or others)    Does the patient repeat questions/statements in the same day?: No  Does the patient have trouble remembering the date, year, and time?: No  Does the patient have difficulty managing finances?: No  Does the patient have a decreased sense of direction?: No  Assessment/Plan:       Medicare Annual Wellness and Personalized Prevention Plan:   Fall Risk + Home Safety + Hearing Impairment + Depression Screen + Cognitive Impairment Screen + Health Risk Assessment all reviewed.     Opioid Screening: Patient medication list reviewed, patient is not taking prescription opioids. Patient is not using additional opioids than prescribed. Patient is at low risk of substance abuse based on this opioid use history.         Health Maintenance Topics with due status: Not Due       Topic Last Completion Date    Colorectal Cancer Screening 03/09/2020    DEXA Scan 01/27/2022    Eye Exam 03/17/2022    Foot Exam 07/25/2022    Low Dose Statin 12/05/2022    Diabetes Urine Screening 01/19/2023    Lipid Panel 01/19/2023    Hemoglobin A1c 01/19/2023      The patient's Health Maintenance was  reviewed and the following appears to be due at this time:   Health Maintenance Due   Topic Date Due    COVID-19 Vaccine (5 - Booster for Pfizer series) 07/04/2022    Mammogram  01/27/2023         1. Medicare annual wellness visit, subsequent  Assessment & Plan:  Previously done labs reviewed with patient at time of appointment today  Mammogram   1/2022  DEXA  1/2022  No  Longer does pap due to hyst  Cologuard 3/2020    Advanced care planning discussed and paperwork given        2. Advanced care planning/counseling discussion  Assessment & Plan:  Advanced care planning discussed and paperwork given.    I attest that I have had a face to face discussion with patient and or surrogate decision maker.   Included surrogate decision maker: NO  Advanced directive in chart: NO  LAPOST: NO    Total time spent: 16 minutes        3. Impaired mobility  Assessment & Plan:  Has handicap tag       4. Ex-cigarette smoker  Assessment & Plan:  Lung cancer screening 7/2022.       5. Osteopenia, unspecified location  Assessment & Plan:  See osteopenia A&P      6. Class 2 severe obesity due to excess calories with serious comorbidity and body mass index (BMI) of 38.0 to 38.9 in adult  Assessment & Plan:  Encourage lifestyle change      7. Vitamin D deficiency  Assessment & Plan:  Continue to supplement otc      8. Type 2 diabetes mellitus with hyperglycemia, without long-term current use of insulin  Assessment & Plan:  Lab Results   Component Value Date    HGBA1C 5.8 01/19/2023     Urine micro 1/2023  Foot exam 7/2022  Eye exam with dr. alas    On metformin 850 mg BID. Will decrease to qday.  On acei and statin.  Continue to follow dmii diet. Continue to monitor cbgs.   Contact clinic for concerns.     Orders:  -     Comprehensive Metabolic Panel; Future; Expected date: 07/26/2023  -     Hemoglobin A1C; Future; Expected date: 07/26/2023  -     Discontinue: metFORMIN (GLUCOPHAGE) 850 MG tablet; Take 1 tablet (850 mg total) by mouth daily  with breakfast. 2 tab po q am and 1 tab po qpm  Dispense: 90 tablet; Refill: 3  -     metFORMIN (GLUCOPHAGE) 850 MG tablet; Take 1 tablet (850 mg total) by mouth daily with breakfast.  Dispense: 90 tablet; Refill: 3    9. Tetanus, diphtheria, and acellular pertussis (Tdap) vaccination declined  Assessment & Plan:  Declines immunizations today      10. Breast cancer screening by mammogram  Assessment & Plan:  See breast cancer A&P      11. Postmenopausal  Assessment & Plan:  See osteopenia A&P      12. Hypertension associated with diabetes  Assessment & Plan:  Well controlled on current prescription meds.       13. Hyperlipidemia associated with type 2 diabetes mellitus  Assessment & Plan:  On statin      14. History of breast cancer  Assessment & Plan:  Dx 3/2019.  History of left lumpectomy and then mastectomy 5/2019.  No chemo or radiation.  keep appts with dr. Rios,   Continue annual screening      15. Nicotine dependence, cigarettes, uncomplicated  -     CT Chest Lung Screening Low Dose; Future; Expected date: 07/26/2023         Advance Care Planning   I attest to discussing Advance Care Planning with patient and/or family member.  Education was provided including the importance of the Health Care Power of , Advance Directives, and/or [place] documentation.  The patient expressed understanding to the importance of this information and discussion.  Length of ACP conversation in minutes: 16         Medication List with Changes/Refills   Current Medications    CALCIUM-VITAMIN D3 (OS-MICHELLE 500 + D3) 500 MG-5 MCG (200 UNIT) PER TABLET    Take 1 tablet by mouth.       Start Date: --        End Date: --    ENALAPRIL (VASOTEC) 10 MG TABLET    TAKE 1 TABLET BY MOUTH ONCE DAILY TAKE WITH 20MG FOR A TOTAL OF 30MG       Start Date: 1/15/2023 End Date: --    ENALAPRIL (VASOTEC) 20 MG TABLET      See Instructions, TAKE 1 TABLET BY MOUTH ONCE DAILY TAKE  WITH  10MG  FOR  A  TOTAL  OF  30MG, # 90 EA, 3 Refill(s),  Pharmacy: [id number], 155, cm, Height/Length Dosing, 01/25/22 10:08:00 CST, 102.85, kg, Weight Dosing, 01/25/22 10:08:00 CST       Start Date: 1/25/2022 End Date: --    SIMVASTATIN (ZOCOR) 20 MG TABLET    Take 1 tablet by mouth once daily       Start Date: 12/5/2022 End Date: --   Changed and/or Refilled Medications    Modified Medication Previous Medication    METFORMIN (GLUCOPHAGE) 850 MG TABLET metFORMIN (GLUCOPHAGE) 850 MG tablet       Take 1 tablet (850 mg total) by mouth daily with breakfast.    2 tab po q am and 1 tab po qpm       Start Date: 1/26/2023 End Date: 1/26/2024    Start Date: 7/25/2022 End Date: 1/26/2023        Follow up in about 6 months (around 7/26/2023) for diabetes with labs. In addition to their scheduled follow up, the patient has also been instructed to follow up on as needed basis.

## 2023-01-26 NOTE — ASSESSMENT & PLAN NOTE
Dx 3/2019.  History of left lumpectomy and then mastectomy 5/2019.  No chemo or radiation.  keep appts with dr. Rios,   Continue annual screening

## 2023-01-30 LAB — BCS RECOMMENDATION EXT: NORMAL

## 2023-01-30 NOTE — PROGRESS NOTES
[facility] Breast Surg  Breast Surgical Oncology  New Patient Office Visit - H&P      Referring Provider: Dr. Cherri Watson in [facility]    PCP: Dr. Watson    Chief Complaint:   Chief Complaint   Patient presents with    Follow-up     Patient presented for 1year Follow up visit. Had MG done 23 [facility]. No breast pain, nipple d/c, redness.      Subjective:      Interval History:   2023 She currently denies any breast issues including rashes, redness, pain, swelling, nipple discharge, or new lumps/masses.She is doing well.  She went to [facility] [place] in the interval and got a prosthesis bra and was very happy with their care. She had a Right Breast SCR MG in the interval that resulted as benign.  She would like to start having her imaging done here.     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 ER 98.81% and MS 99.27%. She underwent left PM/lumpectomy with oncoplastic reduction on 3/29/2019. Final pathology revealed AJCC 8th ed pathologic anatomic/prognostic stage IA (pT1mi pN0(sn) M0) MICRO-INVASIVE CARCINOMA (1 MM) ARISING IN EXTENSIVE, MULTIFOCAL DUCTAL CARCINOMA IN SITU, LOW GRADE. DCIS IS PRESENT IN THE MEDIAL AND SUPERIOR MARGINS. DCIS IS <1.0 MM FROM THE POSTERIOR MARGIN. TWO SENTINEL LYMPH NODES NEGATIVE FOR MALIGNANCY. ER 91.64%, MS 93.79%, and HER2 dheeraj 0 on IHC (negative). She also had a lipoma present within her breast which was also removed. She is SP completion left breast simple mastectomy on 2019. Final pathology did not reveal any residual carcinoma. She does not want to have adjuvant endocrine therapy.       Imagin. 2019 SCR MG at [facility] -which revealed an area of architectural distortion in the upper  outer quadrant of the left breast. BIRADS-0: additional imaging was requested.    2.  2/5/2019 Left DG MG/US  - which revealed a persistent distortion of the left breast and at 1:00 on ultrasound there was 3.1 cm x 1.9 cm x 0.9 cm irregular area with questionable cystic changes. BIRADS-4: biopsy was recommended    3. 1/20/2020 Right SCR MG at [facility] - which revealed no mammographic evidence of malignancy in the right breast. Routine screening mammography in one year is recommended    4. 1/27/2022 RIGHT SCR MG at [facility] - which revealed no mammographic evidence of malignancy.  Routine screening mammogram in 1 year is recommended.    5. 1/30/2023  Right Breast SCR MG at [place] - Prior Left mastectomy. No new dominant masses or suspicious calcifications noted.SCR MG in one year. BENIGN    Pathology:       OB / GYN History   Menarche Onset : 16 year(s)   Menstrual Comments : 1 pregnancy, 1 child birth   first born at 21  Used BC @ 20  had hysterectomy @ 29 swollen ovaries  Menopause   no hormones used   no treatments used to get pregnant  FH aunt BC, aunt cervical BC    Other History:     Past Medical History:   Diagnosis Date    Arthritis     Breast cancer     Left    Diabetes mellitus     Hyperlipidemia     Hypertension         Past Surgical History:   Procedure Laterality Date    APPENDECTOMY      BREAST BIOPSY      BREAST SURGERY      CHOLECYSTECTOMY      HYSTERECTOMY          Social History     Socioeconomic History    Marital status:    Tobacco Use    Smoking status: Former     Types: Cigarettes    Smokeless tobacco: Never    Tobacco comments:     Quit 30yrs ago   Substance and Sexual Activity    Alcohol use: Not Currently        Immunization History   Administered Date(s) Administered    COVID-19, MRNA, LN-S, PF (Pfizer) (Gray Cap) 05/09/2022    COVID-19, MRNA, LN-S, PF (Pfizer) (Purple Cap) 03/10/2021, 03/31/2021, 10/01/2021    Influenza (FLUBLOK) - Quadrivalent - Recombinant - PF  *Preferred* (egg allergy) 11/01/2021    Influenza - High Dose - PF (65 years and older) 11/12/2015, 09/26/2016    Influenza - Quadrivalent 10/07/2020    Influenza - Quadrivalent - High Dose - PF (65 years and older) 10/05/2022    Influenza - Trivalent - PF (ADULT) 09/22/2012, 10/20/2014, 09/27/2017, 10/05/2018, 10/22/2019, 10/07/2020    Influenza Split 09/22/2012    Pneumococcal Conjugate - 13 Valent 01/14/2019        Medications/Allergies:    Current Outpatient Medications on File Prior to Visit   Medication Sig Dispense Refill    calcium-vitamin D3 (OS-MICHELLE 500 + D3) 500 mg-5 mcg (200 unit) per tablet Take 1 tablet by mouth.      enalapril (VASOTEC) 10 MG tablet TAKE 1 TABLET BY MOUTH ONCE DAILY TAKE WITH 20MG FOR A TOTAL OF 30MG      enalapril (VASOTEC) 20 MG tablet   See Instructions, TAKE 1 TABLET BY MOUTH ONCE DAILY TAKE  WITH  10MG  FOR  A  TOTAL  OF  30MG, # 90 EA, 3 Refill(s), Pharmacy: [id number], 155, cm, Height/Length Dosing, 01/25/22 10:08:00 CST, 102.85, kg, Weight Dosing, 01/25/22 10:08:00 CST      metFORMIN (GLUCOPHAGE) 850 MG tablet Take 1 tablet (850 mg total) by mouth daily with breakfast. 90 tablet 3    simvastatin (ZOCOR) 20 MG tablet Take 1 tablet by mouth once daily 90 tablet 0     No current facility-administered medications on file prior to visit.        Review of patient's allergies indicates:   Allergen Reactions    Acetaminophen      Other reaction(s): rash    Amlodipine      Other reaction(s): muscle cramps        Review of Systems:      Comprehensive ROS normal except: see HPI       Objective/Physical Exam     Vitals:    02/07/23 0956   BP: (!) 161/75   Pulse: 60   Resp: 18   Temp: 97.9 °F (36.6 °C)        General: The patient is awake, alert and oriented times three. The patient is well nourished and in no acute distress.  Neck: There is no evidence of palpable cervical, supraclavicular or axillary adenopathy. The neck is supple.   Musculoskeletal: The patient has a normal  range of motion of her bilateral upper extremities.  Chest: Examination of the chest wall fails to reveal any obvious abnormalities. Nonlabored breathing, symmetric expansion.  Breast:  Right: Examination of right breast fails to reveal any dominant masses or areas of significant focal nodularity. The nipple is everted without evidence of discharge. There is no skin dimpling with movement of the pectoralis. There are no significant skin changes overlying the breast.   Left: Examination of the left breast fails to reveal any dominant masses or areas of significant focal nodularity. Left mastectomy scar has healed well. There is no skin dimpling with movement of the pectoralis. There are no significant skin changes overlying the breast.  Abdomen: The abdomen is soft, flat, nontender and nondistended.  Integumentary: no rashes or skin lesions present  Neurologic: cranial nerves intact, no signs of peripheral neurological deficit, motor/sensory function intact       Assessment and Plan     Patient Active Problem List   Diagnosis    Medicare annual wellness visit, subsequent    Postmenopausal    Breast cancer screening by mammogram    Hypertension associated with diabetes    Hyperlipidemia associated with type 2 diabetes mellitus    Diabetes mellitus    Impaired mobility    Malignant neoplasm of breast    Osteopenia    Vitamin D deficiency    Tetanus, diphtheria, and acellular pertussis (Tdap) vaccination declined    Lung mass    Ex-cigarette smoker    Class 2 severe obesity due to excess calories with serious comorbidity and body mass index (BMI) of 38.0 to 38.9 in adult    Advanced care planning/counseling discussion    History of breast cancer        Dodie was seen today for follow-up.    Diagnoses and all orders for this visit:    Encounter for follow-up surveillance of breast cancer  -     Mammo Digital Screening Right with Keegan; Future    Personal history of breast cancer  -     Mammo Digital Screening Right with  Keegan; Future    S/P mastectomy, left  -     Mammo Digital Screening Right with Keegan; Future    Screening mammogram for breast cancer  -     Mammo Digital Screening Right with Keegan; Future       ---------------------------------------------------------------------------------------------------------------    PLAN:      1. R SCR MG January 2024 at [facility]    2. RTC in 1 year after imaging    3. Healthy lifestyle guidelines were reviewed. She was encouraged to engage in regular exercise, maintain a healthy body weight, and avoid excessive alcohol consumption. Healthy nutritional guidelines were also discussed. Self-breast examination was reviewed with the patient in detail and she was encouraged to perform this on a monthly basis.    4. Please call our office with any questions or concerns that may arise before the next appointment.      All of her questions were answered.      WILFREDO Arguello

## 2023-01-31 ENCOUNTER — DOCUMENTATION ONLY (OUTPATIENT)
Dept: FAMILY MEDICINE | Facility: CLINIC | Age: 73
End: 2023-01-31
Payer: MEDICARE

## 2023-02-07 ENCOUNTER — OFFICE VISIT (OUTPATIENT)
Dept: SURGERY | Facility: CLINIC | Age: 73
End: 2023-02-07
Payer: MEDICARE

## 2023-02-07 VITALS
BODY MASS INDEX: 39.05 KG/M2 | DIASTOLIC BLOOD PRESSURE: 75 MMHG | OXYGEN SATURATION: 98 % | TEMPERATURE: 98 F | HEIGHT: 61 IN | SYSTOLIC BLOOD PRESSURE: 161 MMHG | HEART RATE: 60 BPM | RESPIRATION RATE: 18 BRPM | WEIGHT: 206.81 LBS

## 2023-02-07 DIAGNOSIS — Z12.31 SCREENING MAMMOGRAM FOR BREAST CANCER: ICD-10-CM

## 2023-02-07 DIAGNOSIS — Z08 ENCOUNTER FOR FOLLOW-UP SURVEILLANCE OF BREAST CANCER: Primary | ICD-10-CM

## 2023-02-07 DIAGNOSIS — Z85.3 PERSONAL HISTORY OF BREAST CANCER: ICD-10-CM

## 2023-02-07 DIAGNOSIS — Z85.3 ENCOUNTER FOR FOLLOW-UP SURVEILLANCE OF BREAST CANCER: Primary | ICD-10-CM

## 2023-02-07 DIAGNOSIS — Z90.12 S/P MASTECTOMY, LEFT: ICD-10-CM

## 2023-02-07 PROCEDURE — 99999 PR PBB SHADOW E&M-EST. PATIENT-LVL IV: CPT | Mod: PBBFAC,,,

## 2023-02-07 PROCEDURE — 99213 OFFICE O/P EST LOW 20 MIN: CPT | Mod: S$PBB,,,

## 2023-02-07 PROCEDURE — 99213 PR OFFICE/OUTPT VISIT, EST, LEVL III, 20-29 MIN: ICD-10-PCS | Mod: S$PBB,,,

## 2023-02-07 PROCEDURE — 99999 PR PBB SHADOW E&M-EST. PATIENT-LVL IV: ICD-10-PCS | Mod: PBBFAC,,,

## 2023-02-07 PROCEDURE — 99214 OFFICE O/P EST MOD 30 MIN: CPT | Mod: PBBFAC

## 2023-03-01 ENCOUNTER — DOCUMENTATION ONLY (OUTPATIENT)
Dept: ADMINISTRATIVE | Facility: HOSPITAL | Age: 73
End: 2023-03-01
Payer: MEDICARE

## 2023-03-03 RX ORDER — ENALAPRIL MALEATE 20 MG/1
20 TABLET ORAL DAILY
Qty: 90 TABLET | Refills: 3 | Status: SHIPPED | OUTPATIENT
Start: 2023-03-03 | End: 2024-03-14 | Stop reason: SDUPTHER

## 2023-03-03 NOTE — TELEPHONE ENCOUNTER
----- Message from Jenny Schmitz sent at 3/3/2023  9:02 AM CST -----  .Type:  RX Refill Request    Who Called: pt  Refill or New Rx:refill  RX Name and Strength:enalapril (VASOTEC) 20 MG tablet  How is the patient currently taking it? Once a day   Is this a 30 day or 90 day RX:90  Preferred Pharmacy with phone number:[facility] - [place], [place] - [address]  Local or Mail Order:local  Ordering Provider:Shirley  Would the patient rather a call back or a response via [facility]? Call back  Best Call Back Number:[phone]  Additional Information: pt is requesting a refill

## 2023-03-15 RX ORDER — SIMVASTATIN 20 MG/1
20 TABLET, FILM COATED ORAL DAILY
Qty: 90 TABLET | Refills: 3 | Status: SHIPPED | OUTPATIENT
Start: 2023-03-15

## 2023-03-15 RX ORDER — ENALAPRIL MALEATE 10 MG/1
10 TABLET ORAL DAILY
Qty: 90 TABLET | Refills: 3 | Status: SHIPPED | OUTPATIENT
Start: 2023-03-15

## 2023-03-15 NOTE — TELEPHONE ENCOUNTER
----- Message from Amrita Stephens sent at 3/14/2023  3:24 PM CDT -----  Regarding: med refill  .Type:  RX Refill Request    Who Called: pt  Refill or New Rx:refill  RX Name and Strength:simvastatin (ZOCOR) 20 MG tablet  How is the patient currently taking it? (ex. 1XDay):1xday  Is this a 30 day or 90 day RX:90  Preferred Pharmacy with phone number:[id number] - [place] - [address]   Phone: [phone]  Local or Mail Order:local  Ordering Provider:Shirley  Would the patient rather a call back or a response via [facility]? Call back  Best Call Back Number:[phone]  Additional Information: pt needs authorization from  to refill prescription       .Type:  RX Refill Request    Who Called: pt  Refill or New Rx:refill  RX Name and Strength:enalapril (VASOTEC) 10 MG tablet  How is the patient currently taking it? (ex. 1XDay):1xday  Is this a 30 day or 90 day RX:90  Preferred Pharmacy with phone number:[id number] - [place] - [address]   Phone: [phone]  Local or Mail Order:local  Ordering Provider:Shirley  Would the patient rather a call back or a response via [facility]? Call back  Best Call Back Number:[phone]  Additional Information: pt needs authorization from  to refill prescription

## 2023-03-28 LAB
LEFT EYE DM RETINOPATHY: NEGATIVE
RIGHT EYE DM RETINOPATHY: NEGATIVE

## 2023-03-30 ENCOUNTER — DOCUMENTATION ONLY (OUTPATIENT)
Dept: FAMILY MEDICINE | Facility: CLINIC | Age: 73
End: 2023-03-30
Payer: MEDICARE

## 2023-05-01 PROBLEM — Z00.00 MEDICARE ANNUAL WELLNESS VISIT, SUBSEQUENT: Status: RESOLVED | Noted: 2022-07-25 | Resolved: 2023-05-01

## 2023-06-02 DIAGNOSIS — E11.65 TYPE 2 DIABETES MELLITUS WITH HYPERGLYCEMIA, WITHOUT LONG-TERM CURRENT USE OF INSULIN: ICD-10-CM

## 2023-06-02 RX ORDER — METFORMIN HYDROCHLORIDE 850 MG/1
850 TABLET ORAL
Qty: 90 TABLET | Refills: 3 | Status: SHIPPED | OUTPATIENT
Start: 2023-06-02 | End: 2024-06-01

## 2023-06-02 NOTE — TELEPHONE ENCOUNTER
----- Message from April Stewart sent at 6/2/2023 11:05 AM CDT -----  Regarding: med refill  .Type:  RX Refill Request    Who Called: patient  Refill or New Rx: refill  RX Name and Strength: metFORMIN (GLUCOPHAGE) 850 MG tablet  How is the patient currently taking it? (ex. 1XDay): 1x daily  Is this a 30 day or 90 day RX: 90  Preferred Pharmacy with phone number: [id number] - [place], [place] - [address] [facility].  Local or Mail Order: local  Ordering Provider: Shirley  Would the patient rather a call back or a response via [facility]?  Call back  Best Call Back Number: [phone]  Additional Information:  patient is requesting a refill.

## 2023-07-20 ENCOUNTER — LAB VISIT (OUTPATIENT)
Dept: LAB | Facility: HOSPITAL | Age: 73
End: 2023-07-20
Attending: FAMILY MEDICINE
Payer: MEDICARE

## 2023-07-20 DIAGNOSIS — E11.65 TYPE 2 DIABETES MELLITUS WITH HYPERGLYCEMIA, WITHOUT LONG-TERM CURRENT USE OF INSULIN: ICD-10-CM

## 2023-07-20 LAB
ALBUMIN SERPL-MCNC: 3.9 G/DL (ref 3.4–4.8)
ALBUMIN/GLOB SERPL: 1.1 RATIO (ref 1.1–2)
ALP SERPL-CCNC: 72 UNIT/L (ref 40–150)
ALT SERPL-CCNC: 10 UNIT/L (ref 0–55)
AST SERPL-CCNC: 12 UNIT/L (ref 5–34)
BILIRUBIN DIRECT+TOT PNL SERPL-MCNC: 0.4 MG/DL
BUN SERPL-MCNC: 21.3 MG/DL (ref 9.8–20.1)
CALCIUM SERPL-MCNC: 9.6 MG/DL (ref 8.4–10.2)
CHLORIDE SERPL-SCNC: 109 MMOL/L (ref 98–107)
CO2 SERPL-SCNC: 24 MMOL/L (ref 23–31)
CREAT SERPL-MCNC: 0.73 MG/DL (ref 0.55–1.02)
EST. AVERAGE GLUCOSE BLD GHB EST-MCNC: 119.8 MG/DL
GFR SERPLBLD CREATININE-BSD FMLA CKD-EPI: >60 MLS/MIN/1.73/M2
GLOBULIN SER-MCNC: 3.4 GM/DL (ref 2.4–3.5)
GLUCOSE SERPL-MCNC: 130 MG/DL (ref 82–115)
HBA1C MFR BLD: 5.8 %
POTASSIUM SERPL-SCNC: 4.9 MMOL/L (ref 3.5–5.1)
PROT SERPL-MCNC: 7.3 GM/DL (ref 5.8–7.6)
SODIUM SERPL-SCNC: 143 MMOL/L (ref 136–145)

## 2023-07-20 PROCEDURE — 36415 COLL VENOUS BLD VENIPUNCTURE: CPT

## 2023-07-20 PROCEDURE — 80053 COMPREHEN METABOLIC PANEL: CPT

## 2023-07-20 PROCEDURE — 83036 HEMOGLOBIN GLYCOSYLATED A1C: CPT

## 2023-07-26 ENCOUNTER — TELEPHONE (OUTPATIENT)
Dept: FAMILY MEDICINE | Facility: CLINIC | Age: 73
End: 2023-07-26

## 2023-07-26 ENCOUNTER — OFFICE VISIT (OUTPATIENT)
Dept: FAMILY MEDICINE | Facility: CLINIC | Age: 73
End: 2023-07-26
Payer: MEDICARE

## 2023-07-26 VITALS
OXYGEN SATURATION: 98 % | HEIGHT: 61 IN | DIASTOLIC BLOOD PRESSURE: 82 MMHG | HEART RATE: 66 BPM | SYSTOLIC BLOOD PRESSURE: 128 MMHG | WEIGHT: 206.19 LBS | RESPIRATION RATE: 16 BRPM | BODY MASS INDEX: 38.93 KG/M2 | TEMPERATURE: 98 F

## 2023-07-26 DIAGNOSIS — Z74.09 IMPAIRED MOBILITY: ICD-10-CM

## 2023-07-26 DIAGNOSIS — I70.0 AORTIC ATHEROSCLEROSIS: ICD-10-CM

## 2023-07-26 DIAGNOSIS — E11.69 HYPERLIPIDEMIA ASSOCIATED WITH TYPE 2 DIABETES MELLITUS: ICD-10-CM

## 2023-07-26 DIAGNOSIS — Z01.818 PRE-OP EXAM: Primary | ICD-10-CM

## 2023-07-26 DIAGNOSIS — E11.65 TYPE 2 DIABETES MELLITUS WITH HYPERGLYCEMIA, WITHOUT LONG-TERM CURRENT USE OF INSULIN: Primary | ICD-10-CM

## 2023-07-26 DIAGNOSIS — E55.9 VITAMIN D DEFICIENCY: ICD-10-CM

## 2023-07-26 DIAGNOSIS — E11.59 HYPERTENSION ASSOCIATED WITH DIABETES: ICD-10-CM

## 2023-07-26 DIAGNOSIS — E78.5 HYPERLIPIDEMIA ASSOCIATED WITH TYPE 2 DIABETES MELLITUS: ICD-10-CM

## 2023-07-26 DIAGNOSIS — Z01.818 PRE-OP EXAM: ICD-10-CM

## 2023-07-26 DIAGNOSIS — Z00.00 WELLNESS EXAMINATION: ICD-10-CM

## 2023-07-26 DIAGNOSIS — Z12.11 COLON CANCER SCREENING: ICD-10-CM

## 2023-07-26 DIAGNOSIS — I15.2 HYPERTENSION ASSOCIATED WITH DIABETES: ICD-10-CM

## 2023-07-26 DIAGNOSIS — C50.919 MALIGNANT NEOPLASM OF FEMALE BREAST, UNSPECIFIED ESTROGEN RECEPTOR STATUS, UNSPECIFIED LATERALITY, UNSPECIFIED SITE OF BREAST: ICD-10-CM

## 2023-07-26 DIAGNOSIS — E66.01 CLASS 2 SEVERE OBESITY DUE TO EXCESS CALORIES WITH SERIOUS COMORBIDITY AND BODY MASS INDEX (BMI) OF 38.0 TO 38.9 IN ADULT: ICD-10-CM

## 2023-07-26 PROCEDURE — 99214 PR OFFICE/OUTPT VISIT, EST, LEVL IV, 30-39 MIN: ICD-10-PCS | Mod: ,,, | Performed by: FAMILY MEDICINE

## 2023-07-26 PROCEDURE — 99214 OFFICE O/P EST MOD 30 MIN: CPT | Mod: ,,, | Performed by: FAMILY MEDICINE

## 2023-07-26 NOTE — PROGRESS NOTES
Subjective:        Patient ID: Dodie Brody is a 72 y.o. female.    Chief Complaint: Follow-up (DM follow up )      presents to the clinic unaccompanied for dmii follow up. She is due for her wellness visit in january.  She did labs prior to her appointment and it was reviewed with patient at time of visit today    States having right cataract surgery with dr. Cooper scheduled for 8/22/23. Does not have form with her.  Will request from dr. Cooper office.  She is not on blood thinners.  She denies chest pain or shortness of breath.  She can vacuum a room with out chest pain or shortness of breath.  States took longer to wake up from breast surgery in the past but she will inform cataract anesthesiologist of this.      Did golo.  Has lost 20# on this but was too expensive so not doing anymore.  Doing keto for a few days.  Not lost any more weight since last visit.   She has diabetes. Currently she is on metformin 850mg PO qAm since last lab 7/2021. She is trying her best to follow a diabetic diet. started weight watchers. Her last foot exam today. Her eye exams are done by Dr. Huertas 3/2023. her last a1c 5.8 7/2023.  urine micro 1/2023. went to [employer] - ordered inserts but could not afford.      The patient was diagnosed with breast cancer per her screening mammogram in March 2019. This was abnormal and resulted in a biopsy, subsequent lumpectomy of her left breast and then left mastectomy on May 6, 2019 with Dr. Rios. She reports she will not need chemotherapy or radiation. last visit 8/2020. last mammogram 1/2021. she does these at [facility]. has appt 1/27/22 for mmg. has appt with dr. rios 2/2022     She has hypertension. The patient is on enalapril 20mg in am +10 mg in pm for a total of 30 mg daily. she has a bp machine at home. She is taking her medications as prescribed. she fills this at [facility]. She declines asa. She also has hyperlipidemia and is on simvastatin 20 mg.     She has low vitamin D3 1000IU  "and supplements over-the-counter. Was wnl 1/2022.     She is ALLERGIC to Norvasc and tylenol. completed covid series and booster.      She is a former smoker. quit >17 years ago. Had ct lung cancer screening done 8/2018, 8/2019, 8/2020, 9/2021, 7/2022 which showed stability of lung nodules and recommended repeat in 1year.  When I went to order her repeat for 2023, computer says is not covered by insurance so patient declines     She declines shingles and tetanus and pneumonia vaccination. She had cologuard 3/2020.  Will order repeat. She has had a hysterectomy in her late 20s for noncancerous reasons and therefore no longer does pap smears. dexa 1/2020 showed osteopenia- taking her vitamin d and has not been taking calcium. repeat dexa done 1/2022.      she is mobility impaired and has tag. cannot walk more than 200ft without stopping to rest.  Asking for renewal.    Review of Systems   Constitutional: Negative.    HENT: Negative.     Respiratory: Negative.     Cardiovascular: Negative.    Gastrointestinal: Negative.    Genitourinary: Negative.    Musculoskeletal:  Positive for gait problem.       Review of patient's allergies indicates:   Allergen Reactions    Acetaminophen      Other reaction(s): rash    Amlodipine      Other reaction(s): muscle cramps      Vitals:    07/26/23 0924   BP: 128/82   BP Location: Left arm   Pulse: 66   Resp: 16   Temp: 98 °F (36.7 °C)   TempSrc: Temporal   SpO2: 98%   Weight: 93.5 kg (206 lb 3.2 oz)   Height: 5' 1" (1.549 m)      Social History     Socioeconomic History    Marital status:    Tobacco Use    Smoking status: Former     Types: Cigarettes    Smokeless tobacco: Never    Tobacco comments:     Quit 30yrs ago   Substance and Sexual Activity    Alcohol use: Not Currently      Family History   Problem Relation Age of Onset    Other Mother         heart issues    Lung disease Father         Black lung    Breast cancer Paternal Aunt 72          Objective:     Physical " Exam  Vitals and nursing note reviewed.   Constitutional:       Appearance: Normal appearance. She is obese.   HENT:      Head: Normocephalic and atraumatic.      Nose: Nose normal.      Mouth/Throat:      Mouth: Mucous membranes are moist.      Pharynx: Oropharynx is clear.   Eyes:      Extraocular Movements: Extraocular movements intact.   Cardiovascular:      Rate and Rhythm: Normal rate and regular rhythm.      Pulses: Normal pulses.           Dorsalis pedis pulses are 2+ on the right side and 2+ on the left side.        Posterior tibial pulses are 2+ on the right side and 2+ on the left side.      Heart sounds: Normal heart sounds.   Pulmonary:      Effort: Pulmonary effort is normal.      Breath sounds: Normal breath sounds.   Musculoskeletal:         General: Normal range of motion.      Cervical back: Normal range of motion.        Feet:    Feet:      Right foot:      Protective Sensation: 8 sites tested.  8 sites sensed.      Skin integrity: Skin integrity normal.      Left foot:      Protective Sensation: 8 sites tested.  8 sites sensed.      Skin integrity: Skin integrity normal.   Skin:     General: Skin is warm and dry.   Neurological:      General: No focal deficit present.      Mental Status: She is alert and oriented to person, place, and time. Mental status is at baseline.   Psychiatric:         Mood and Affect: Mood normal.     Current Outpatient Medications on File Prior to Visit   Medication Sig Dispense Refill    calcium-vitamin D3 (OS-MICHELLE 500 + D3) 500 mg-5 mcg (200 unit) per tablet Take 1 tablet by mouth.      enalapril (VASOTEC) 10 MG tablet Take 1 tablet (10 mg total) by mouth once daily. 90 tablet 3    enalapril (VASOTEC) 20 MG tablet Take 1 tablet (20 mg total) by mouth once daily. 90 tablet 3    metFORMIN (GLUCOPHAGE) 850 MG tablet Take 1 tablet (850 mg total) by mouth daily with breakfast. 90 tablet 3    simvastatin (ZOCOR) 20 MG tablet Take 1 tablet (20 mg total) by mouth once daily. 90  tablet 3     No current facility-administered medications on file prior to visit.     Health Maintenance   Topic Date Due    TETANUS VACCINE  01/26/2024 (Originally 11/3/1968)    Lipid Panel  01/19/2024    Hemoglobin A1c  01/20/2024    Mammogram  01/30/2024    Low Dose Statin  03/15/2024    Eye Exam  03/28/2024    Foot Exam  07/26/2024    DEXA Scan  01/27/2025    Hepatitis C Screening  Completed      Results for orders placed or performed in visit on 07/20/23   Comprehensive Metabolic Panel   Result Value Ref Range    Sodium Level 143 136 - 145 mmol/L    Potassium Level 4.9 3.5 - 5.1 mmol/L    Chloride 109 (H) 98 - 107 mmol/L    Carbon Dioxide 24 23 - 31 mmol/L    Glucose Level 130 (H) 82 - 115 mg/dL    Blood Urea Nitrogen 21.3 (H) 9.8 - 20.1 mg/dL    Creatinine 0.73 0.55 - 1.02 mg/dL    Calcium Level Total 9.6 8.4 - 10.2 mg/dL    Protein Total 7.3 5.8 - 7.6 gm/dL    Albumin Level 3.9 3.4 - 4.8 g/dL    Globulin 3.4 2.4 - 3.5 gm/dL    Albumin/Globulin Ratio 1.1 1.1 - 2.0 ratio    Bilirubin Total 0.4 <=1.5 mg/dL    Alkaline Phosphatase 72 40 - 150 unit/L    Alanine Aminotransferase 10 0 - 55 unit/L    Aspartate Aminotransferase 12 5 - 34 unit/L    eGFR >60 mls/min/1.73/m2   Hemoglobin A1C   Result Value Ref Range    Hemoglobin A1c 5.8 <=7.0 %    Estimated Average Glucose 119.8 mg/dL          Assessment & Plan:     Active Problem List with Overview Notes    Diagnosis Date Noted    Aortic atherosclerosis 07/26/2023    Colon cancer screening 07/26/2023    Pre-op exam 07/26/2023    Advanced care planning/counseling discussion 01/26/2023    History of breast cancer 01/26/2023    Postmenopausal 07/25/2022    Breast cancer screening by mammogram 07/25/2022    Hypertension associated with diabetes 07/25/2022    Hyperlipidemia associated with type 2 diabetes mellitus 07/25/2022    Diabetes mellitus 07/25/2022    Impaired mobility 07/25/2022    Malignant neoplasm of breast 07/25/2022    Osteopenia 07/25/2022    Vitamin D  deficiency 07/25/2022    Tetanus, diphtheria, and acellular pertussis (Tdap) vaccination declined 07/25/2022    Lung mass 07/25/2022    Ex-cigarette smoker 07/25/2022    Class 2 severe obesity due to excess calories with serious comorbidity and body mass index (BMI) of 38.0 to 38.9 in adult 07/25/2022       1. Type 2 diabetes mellitus with hyperglycemia, without long-term current use of insulin  Assessment & Plan:  Lab Results   Component Value Date    HGBA1C 5.8 07/20/2023     Urine micro 1/2023  Foot exam today  Eye exam with dr. alas    On metformin 850 mg qday.  On acei and statin.  Continue to follow dmii diet. Continue to monitor cbgs.   Contact clinic for concerns.     Orders:  -     CBC Auto Differential; Future; Expected date: 01/26/2024  -     Comprehensive Metabolic Panel; Future; Expected date: 01/26/2024  -     Lipid Panel; Future; Expected date: 01/26/2024  -     TSH; Future; Expected date: 01/26/2024  -     Hemoglobin A1C; Future; Expected date: 01/26/2024  -     Urinalysis; Future; Expected date: 01/26/2024  -     Vitamin D; Future; Expected date: 01/26/2024  -     Microalbumin/Creatinine Ratio, Urine; Future; Expected date: 01/26/2024    2. Pre-op exam  Assessment & Plan:  Scheduled for right cataract surgery with dr. Cooper 8/22/23. Called his office. Will fax over paperwork.    She is not on blood thinners.  She denies chest pain or shortness of breath.  She can vacuum a room with out chest pain or shortness of breath.  States took longer to wake up from breast surgery in the past but she will inform cataract anesthesiologist of this.         3. Hyperlipidemia associated with type 2 diabetes mellitus  Assessment & Plan:  On statin    Orders:  -     CBC Auto Differential; Future; Expected date: 01/26/2024  -     Comprehensive Metabolic Panel; Future; Expected date: 01/26/2024  -     Lipid Panel; Future; Expected date: 01/26/2024  -     TSH; Future; Expected date: 01/26/2024  -     Hemoglobin A1C;  Future; Expected date: 01/26/2024  -     Urinalysis; Future; Expected date: 01/26/2024  -     Vitamin D; Future; Expected date: 01/26/2024  -     Microalbumin/Creatinine Ratio, Urine; Future; Expected date: 01/26/2024    4. Hypertension associated with diabetes  Assessment & Plan:  Well controlled on current prescription meds.     Orders:  -     CBC Auto Differential; Future; Expected date: 01/26/2024  -     Comprehensive Metabolic Panel; Future; Expected date: 01/26/2024  -     Lipid Panel; Future; Expected date: 01/26/2024  -     TSH; Future; Expected date: 01/26/2024  -     Hemoglobin A1C; Future; Expected date: 01/26/2024  -     Urinalysis; Future; Expected date: 01/26/2024  -     Vitamin D; Future; Expected date: 01/26/2024  -     Microalbumin/Creatinine Ratio, Urine; Future; Expected date: 01/26/2024    5. Class 2 severe obesity due to excess calories with serious comorbidity and body mass index (BMI) of 38.0 to 38.9 in adult  Assessment & Plan:  Encouraged lifestyle change    Orders:  -     CBC Auto Differential; Future; Expected date: 01/26/2024  -     Comprehensive Metabolic Panel; Future; Expected date: 01/26/2024  -     Lipid Panel; Future; Expected date: 01/26/2024  -     TSH; Future; Expected date: 01/26/2024  -     Hemoglobin A1C; Future; Expected date: 01/26/2024  -     Urinalysis; Future; Expected date: 01/26/2024  -     Vitamin D; Future; Expected date: 01/26/2024  -     Microalbumin/Creatinine Ratio, Urine; Future; Expected date: 01/26/2024    6. Aortic atherosclerosis  Assessment & Plan:  On statin.      Orders:  -     CBC Auto Differential; Future; Expected date: 01/26/2024  -     Comprehensive Metabolic Panel; Future; Expected date: 01/26/2024  -     Lipid Panel; Future; Expected date: 01/26/2024  -     TSH; Future; Expected date: 01/26/2024  -     Hemoglobin A1C; Future; Expected date: 01/26/2024  -     Urinalysis; Future; Expected date: 01/26/2024  -     Vitamin D; Future; Expected date:  01/26/2024  -     Microalbumin/Creatinine Ratio, Urine; Future; Expected date: 01/26/2024    7. Impaired mobility  Assessment & Plan:  Handicap tag renewed    Orders:  -     CBC Auto Differential; Future; Expected date: 01/26/2024  -     Comprehensive Metabolic Panel; Future; Expected date: 01/26/2024  -     Lipid Panel; Future; Expected date: 01/26/2024  -     TSH; Future; Expected date: 01/26/2024  -     Hemoglobin A1C; Future; Expected date: 01/26/2024  -     Urinalysis; Future; Expected date: 01/26/2024  -     Vitamin D; Future; Expected date: 01/26/2024  -     Microalbumin/Creatinine Ratio, Urine; Future; Expected date: 01/26/2024    8. Colon cancer screening  Assessment & Plan:  cologuard ordered.     Orders:  -     Cologuard Screening (Multitarget Stool DNA); Future; Expected date: 07/26/2023  -     CBC Auto Differential; Future; Expected date: 01/26/2024  -     Comprehensive Metabolic Panel; Future; Expected date: 01/26/2024  -     Lipid Panel; Future; Expected date: 01/26/2024  -     TSH; Future; Expected date: 01/26/2024  -     Hemoglobin A1C; Future; Expected date: 01/26/2024  -     Urinalysis; Future; Expected date: 01/26/2024  -     Vitamin D; Future; Expected date: 01/26/2024  -     Microalbumin/Creatinine Ratio, Urine; Future; Expected date: 01/26/2024    9. Malignant neoplasm of female breast, unspecified estrogen receptor status, unspecified laterality, unspecified site of breast  Assessment & Plan:  Dx 3/2019.  History of left lumpectomy and then mastectomy 5/2019.  No chemo or radiation.  keep appts with dr. Rios,   Continue annual screening    Orders:  -     CBC Auto Differential; Future; Expected date: 01/26/2024  -     Comprehensive Metabolic Panel; Future; Expected date: 01/26/2024  -     Lipid Panel; Future; Expected date: 01/26/2024  -     TSH; Future; Expected date: 01/26/2024  -     Hemoglobin A1C; Future; Expected date: 01/26/2024  -     Urinalysis; Future; Expected date: 01/26/2024  -      Vitamin D; Future; Expected date: 01/26/2024  -     Microalbumin/Creatinine Ratio, Urine; Future; Expected date: 01/26/2024    10. Wellness examination  -     CBC Auto Differential; Future; Expected date: 01/26/2024  -     Comprehensive Metabolic Panel; Future; Expected date: 01/26/2024  -     Lipid Panel; Future; Expected date: 01/26/2024  -     TSH; Future; Expected date: 01/26/2024  -     Hemoglobin A1C; Future; Expected date: 01/26/2024  -     Urinalysis; Future; Expected date: 01/26/2024  -     Vitamin D; Future; Expected date: 01/26/2024  -     Microalbumin/Creatinine Ratio, Urine; Future; Expected date: 01/26/2024    11. Vitamin D deficiency  -     CBC Auto Differential; Future; Expected date: 01/26/2024  -     Comprehensive Metabolic Panel; Future; Expected date: 01/26/2024  -     Lipid Panel; Future; Expected date: 01/26/2024  -     TSH; Future; Expected date: 01/26/2024  -     Hemoglobin A1C; Future; Expected date: 01/26/2024  -     Urinalysis; Future; Expected date: 01/26/2024  -     Vitamin D; Future; Expected date: 01/26/2024  -     Microalbumin/Creatinine Ratio, Urine; Future; Expected date: 01/26/2024         Follow up in about 6 months (around 1/26/2024) for Medicare Wellness with labs.

## 2023-07-26 NOTE — ASSESSMENT & PLAN NOTE
Lab Results   Component Value Date    HGBA1C 5.8 07/20/2023     Urine micro 1/2023  Foot exam today  Eye exam with dr. alas    On metformin 850 mg qday.  On acei and statin.  Continue to follow dmii diet. Continue to monitor cbgs.   Contact clinic for concerns.

## 2023-07-26 NOTE — ASSESSMENT & PLAN NOTE
Scheduled for right cataract surgery with dr. Cooper 8/22/23. Called his office. Will fax over paperwork.    She is not on blood thinners.  She denies chest pain or shortness of breath.  She can vacuum a room with out chest pain or shortness of breath.  States took longer to wake up from breast surgery in the past but she will inform cataract anesthesiologist of this.

## 2023-07-26 NOTE — TELEPHONE ENCOUNTER
Cataract surgery clearance form received and reviewed.  Is asking for ekg.  Order placed. Please have patient come asap to complete so we can get her clearance forms back to dr. Cooper before her surgery date.     I have signed for the following orders AND/OR meds.  Please call the patient and ask the patient to schedule the testing AND/OR inform about any medications that were sent.      Orders Placed This Encounter   Procedures    SCHEDULED EKG 12-LEAD (to Muse)     Standing Status:   Future     Standing Expiration Date:   7/26/2024

## 2023-08-02 ENCOUNTER — CLINICAL SUPPORT (OUTPATIENT)
Dept: RESPIRATORY THERAPY | Facility: HOSPITAL | Age: 73
End: 2023-08-02
Attending: FAMILY MEDICINE
Payer: MEDICARE

## 2023-08-02 DIAGNOSIS — Z01.818 PRE-OP EXAM: ICD-10-CM

## 2023-08-02 PROCEDURE — 93010 ELECTROCARDIOGRAM REPORT: CPT | Mod: ,,, | Performed by: INTERNAL MEDICINE

## 2023-08-02 PROCEDURE — 93005 ELECTROCARDIOGRAM TRACING: CPT

## 2023-08-02 PROCEDURE — 93010 EKG 12-LEAD: ICD-10-PCS | Mod: ,,, | Performed by: INTERNAL MEDICINE

## 2023-08-03 NOTE — PROGRESS NOTES
Please inform patient of results.    1. Ekg shows nsr.  I think this was for upcoming cataract surgery.  Do we have her form to complete?

## 2023-08-07 LAB — NONINV COLON CA DNA+OCC BLD SCRN STL QL: NEGATIVE

## 2023-09-14 ENCOUNTER — HOSPITAL ENCOUNTER (OUTPATIENT)
Dept: RADIOLOGY | Facility: HOSPITAL | Age: 73
Discharge: HOME | End: 2023-09-14
Attending: FAMILY MEDICINE
Payer: MEDICARE

## 2023-09-14 DIAGNOSIS — F17.210 NICOTINE DEPENDENCE, CIGARETTES, UNCOMPLICATED: ICD-10-CM

## 2023-09-14 PROCEDURE — 71271 CT THORAX LUNG CANCER SCR C-: CPT | Mod: TC

## 2023-09-14 NOTE — PROGRESS NOTES
CT chest for lung cancer screening shows benign findings.  Recommend to continue with annual screening.

## 2023-10-25 ENCOUNTER — TELEPHONE (OUTPATIENT)
Dept: FAMILY MEDICINE | Facility: CLINIC | Age: 73
End: 2023-10-25
Payer: MEDICARE

## 2023-10-25 NOTE — TELEPHONE ENCOUNTER
----- Message from Maisha Irving LPN sent at 10/25/2023  8:19 AM CDT -----  Please reach out to patient to schedule an appt thank you   ----- Message -----  From: Earl Fuentes  Sent: 10/25/2023   8:07 AM CDT  To: Shirley THURSTON Staff    .Type:  Needs Medical Advice    Who Called: Dodie  Symptoms (please be specific):    How long has patient had these symptoms:    Pharmacy name and phone #:    Would the patient rather a call back or a response via [facility]?   Best Call Back Number: [phone]  Additional Information: Requested a call back from the nurse re: on Oct 16 th she fell on her brick step her back is badly hurting and she needed to know if the doctor could set up a referral for her for an x ray.

## 2024-01-23 ENCOUNTER — LAB VISIT (OUTPATIENT)
Dept: LAB | Facility: HOSPITAL | Age: 74
End: 2024-01-23
Attending: FAMILY MEDICINE
Payer: MEDICARE

## 2024-01-23 DIAGNOSIS — I15.2 HYPERTENSION ASSOCIATED WITH DIABETES: ICD-10-CM

## 2024-01-23 DIAGNOSIS — E66.01 CLASS 2 SEVERE OBESITY DUE TO EXCESS CALORIES WITH SERIOUS COMORBIDITY AND BODY MASS INDEX (BMI) OF 38.0 TO 38.9 IN ADULT: ICD-10-CM

## 2024-01-23 DIAGNOSIS — E11.65 TYPE 2 DIABETES MELLITUS WITH HYPERGLYCEMIA, WITHOUT LONG-TERM CURRENT USE OF INSULIN: ICD-10-CM

## 2024-01-23 DIAGNOSIS — Z74.09 IMPAIRED MOBILITY: ICD-10-CM

## 2024-01-23 DIAGNOSIS — E11.59 HYPERTENSION ASSOCIATED WITH DIABETES: ICD-10-CM

## 2024-01-23 DIAGNOSIS — E11.69 HYPERLIPIDEMIA ASSOCIATED WITH TYPE 2 DIABETES MELLITUS: ICD-10-CM

## 2024-01-23 DIAGNOSIS — Z12.11 COLON CANCER SCREENING: ICD-10-CM

## 2024-01-23 DIAGNOSIS — I70.0 AORTIC ATHEROSCLEROSIS: ICD-10-CM

## 2024-01-23 DIAGNOSIS — Z00.00 WELLNESS EXAMINATION: ICD-10-CM

## 2024-01-23 DIAGNOSIS — E55.9 VITAMIN D DEFICIENCY: ICD-10-CM

## 2024-01-23 DIAGNOSIS — C50.919 MALIGNANT NEOPLASM OF FEMALE BREAST, UNSPECIFIED ESTROGEN RECEPTOR STATUS, UNSPECIFIED LATERALITY, UNSPECIFIED SITE OF BREAST: ICD-10-CM

## 2024-01-23 DIAGNOSIS — E78.5 HYPERLIPIDEMIA ASSOCIATED WITH TYPE 2 DIABETES MELLITUS: ICD-10-CM

## 2024-01-23 LAB
ALBUMIN SERPL-MCNC: 3.9 G/DL (ref 3.4–4.8)
ALBUMIN/GLOB SERPL: 1.1 RATIO (ref 1.1–2)
ALP SERPL-CCNC: 70 UNIT/L (ref 40–150)
ALT SERPL-CCNC: 12 UNIT/L (ref 0–55)
AST SERPL-CCNC: 17 UNIT/L (ref 5–34)
BASOPHILS # BLD AUTO: 0.05 X10(3)/MCL
BASOPHILS NFR BLD AUTO: 0.6 %
BILIRUB SERPL-MCNC: 0.4 MG/DL
BUN SERPL-MCNC: 17.4 MG/DL (ref 9.8–20.1)
CALCIUM SERPL-MCNC: 9.8 MG/DL (ref 8.4–10.2)
CHLORIDE SERPL-SCNC: 108 MMOL/L (ref 98–107)
CHOLEST SERPL-MCNC: 169 MG/DL
CHOLEST/HDLC SERPL: 3 {RATIO} (ref 0–5)
CO2 SERPL-SCNC: 23 MMOL/L (ref 23–31)
CREAT SERPL-MCNC: 0.83 MG/DL (ref 0.55–1.02)
CREAT UR-MCNC: 69.1 MG/DL (ref 45–106)
EOSINOPHIL # BLD AUTO: 0.26 X10(3)/MCL (ref 0–0.9)
EOSINOPHIL NFR BLD AUTO: 3.3 %
ERYTHROCYTE [DISTWIDTH] IN BLOOD BY AUTOMATED COUNT: 14.5 % (ref 11.5–17)
EST. AVERAGE GLUCOSE BLD GHB EST-MCNC: 139.9 MG/DL
GFR SERPLBLD CREATININE-BSD FMLA CKD-EPI: >60 MLS/MIN/1.73/M2
GLOBULIN SER-MCNC: 3.6 GM/DL (ref 2.4–3.5)
GLUCOSE SERPL-MCNC: 139 MG/DL (ref 82–115)
HBA1C MFR BLD: 6.5 %
HCT VFR BLD AUTO: 42.1 % (ref 37–47)
HDLC SERPL-MCNC: 53 MG/DL (ref 35–60)
HGB BLD-MCNC: 14 G/DL (ref 12–16)
IMM GRANULOCYTES # BLD AUTO: 0.02 X10(3)/MCL (ref 0–0.04)
IMM GRANULOCYTES NFR BLD AUTO: 0.3 %
LDLC SERPL CALC-MCNC: 89 MG/DL (ref 50–140)
LYMPHOCYTES # BLD AUTO: 2.6 X10(3)/MCL (ref 0.6–4.6)
LYMPHOCYTES NFR BLD AUTO: 32.6 %
MCH RBC QN AUTO: 29.6 PG (ref 27–31)
MCHC RBC AUTO-ENTMCNC: 33.3 G/DL (ref 33–36)
MCV RBC AUTO: 89 FL (ref 80–94)
MICROALBUMIN UR-MCNC: <5 UG/ML
MICROALBUMIN/CREAT RATIO PNL UR: NORMAL
MONOCYTES # BLD AUTO: 0.65 X10(3)/MCL (ref 0.1–1.3)
MONOCYTES NFR BLD AUTO: 8.1 %
NEUTROPHILS # BLD AUTO: 4.4 X10(3)/MCL (ref 2.1–9.2)
NEUTROPHILS NFR BLD AUTO: 55.1 %
NRBC BLD AUTO-RTO: 0 %
PLATELET # BLD AUTO: 209 X10(3)/MCL (ref 130–400)
PMV BLD AUTO: 10.7 FL (ref 7.4–10.4)
POTASSIUM SERPL-SCNC: 4.9 MMOL/L (ref 3.5–5.1)
PROT SERPL-MCNC: 7.5 GM/DL (ref 5.8–7.6)
RBC # BLD AUTO: 4.73 X10(6)/MCL (ref 4.2–5.4)
SODIUM SERPL-SCNC: 140 MMOL/L (ref 136–145)
TRIGL SERPL-MCNC: 133 MG/DL (ref 37–140)
TSH SERPL-ACNC: 3.26 UIU/ML (ref 0.35–4.94)
VLDLC SERPL CALC-MCNC: 27 MG/DL
WBC # SPEC AUTO: 7.98 X10(3)/MCL (ref 4.5–11.5)

## 2024-01-23 PROCEDURE — 80061 LIPID PANEL: CPT

## 2024-01-23 PROCEDURE — 85025 COMPLETE CBC W/AUTO DIFF WBC: CPT

## 2024-01-23 PROCEDURE — 84443 ASSAY THYROID STIM HORMONE: CPT

## 2024-01-23 PROCEDURE — 80053 COMPREHEN METABOLIC PANEL: CPT

## 2024-01-23 PROCEDURE — 82043 UR ALBUMIN QUANTITATIVE: CPT

## 2024-01-23 PROCEDURE — 83036 HEMOGLOBIN GLYCOSYLATED A1C: CPT

## 2024-01-23 PROCEDURE — 36415 COLL VENOUS BLD VENIPUNCTURE: CPT

## 2024-01-31 ENCOUNTER — HOSPITAL ENCOUNTER (OUTPATIENT)
Dept: RADIOLOGY | Facility: HOSPITAL | Age: 74
Discharge: HOME | End: 2024-01-31
Payer: MEDICARE

## 2024-01-31 DIAGNOSIS — Z85.3 PERSONAL HISTORY OF BREAST CANCER: ICD-10-CM

## 2024-01-31 DIAGNOSIS — Z90.12 S/P MASTECTOMY, LEFT: ICD-10-CM

## 2024-01-31 DIAGNOSIS — Z12.31 SCREENING MAMMOGRAM FOR BREAST CANCER: ICD-10-CM

## 2024-01-31 DIAGNOSIS — Z08 ENCOUNTER FOR FOLLOW-UP SURVEILLANCE OF BREAST CANCER: ICD-10-CM

## 2024-01-31 DIAGNOSIS — Z85.3 ENCOUNTER FOR FOLLOW-UP SURVEILLANCE OF BREAST CANCER: ICD-10-CM

## 2024-01-31 PROCEDURE — 77067 SCR MAMMO BI INCL CAD: CPT | Mod: TC,52

## 2024-01-31 PROCEDURE — 77067 SCR MAMMO BI INCL CAD: CPT | Mod: 26,52,, | Performed by: RADIOLOGY

## 2024-01-31 PROCEDURE — 77063 BREAST TOMOSYNTHESIS BI: CPT | Mod: 26,52,, | Performed by: RADIOLOGY

## 2024-02-01 ENCOUNTER — OFFICE VISIT (OUTPATIENT)
Dept: FAMILY MEDICINE | Facility: CLINIC | Age: 74
End: 2024-02-01
Payer: MEDICARE

## 2024-02-01 VITALS
HEIGHT: 61 IN | TEMPERATURE: 98 F | BODY MASS INDEX: 40.4 KG/M2 | SYSTOLIC BLOOD PRESSURE: 136 MMHG | HEART RATE: 70 BPM | RESPIRATION RATE: 16 BRPM | OXYGEN SATURATION: 97 % | WEIGHT: 214 LBS | DIASTOLIC BLOOD PRESSURE: 76 MMHG

## 2024-02-01 DIAGNOSIS — E55.9 VITAMIN D DEFICIENCY: ICD-10-CM

## 2024-02-01 DIAGNOSIS — I15.2 HYPERTENSION ASSOCIATED WITH DIABETES: ICD-10-CM

## 2024-02-01 DIAGNOSIS — E11.69 HYPERLIPIDEMIA ASSOCIATED WITH TYPE 2 DIABETES MELLITUS: ICD-10-CM

## 2024-02-01 DIAGNOSIS — E66.01 CLASS 2 SEVERE OBESITY DUE TO EXCESS CALORIES WITH SERIOUS COMORBIDITY AND BODY MASS INDEX (BMI) OF 38.0 TO 38.9 IN ADULT: ICD-10-CM

## 2024-02-01 DIAGNOSIS — Z23 NEED FOR TDAP VACCINATION: ICD-10-CM

## 2024-02-01 DIAGNOSIS — E11.59 HYPERTENSION ASSOCIATED WITH DIABETES: ICD-10-CM

## 2024-02-01 DIAGNOSIS — E78.5 HYPERLIPIDEMIA ASSOCIATED WITH TYPE 2 DIABETES MELLITUS: ICD-10-CM

## 2024-02-01 DIAGNOSIS — I70.0 AORTIC ATHEROSCLEROSIS: ICD-10-CM

## 2024-02-01 DIAGNOSIS — Z98.49 STATUS POST CATARACT EXTRACTION, UNSPECIFIED LATERALITY: ICD-10-CM

## 2024-02-01 DIAGNOSIS — Z00.00 ENCOUNTER FOR MEDICARE ANNUAL WELLNESS EXAM: Primary | ICD-10-CM

## 2024-02-01 DIAGNOSIS — Z23 NEED FOR VACCINATION FOR STREP PNEUMONIAE: ICD-10-CM

## 2024-02-01 PROCEDURE — G0009 ADMIN PNEUMOCOCCAL VACCINE: HCPCS | Mod: ,,, | Performed by: FAMILY MEDICINE

## 2024-02-01 PROCEDURE — G0439 PPPS, SUBSEQ VISIT: HCPCS | Mod: ,,, | Performed by: FAMILY MEDICINE

## 2024-02-01 PROCEDURE — 90677 PCV20 VACCINE IM: CPT | Mod: ,,, | Performed by: FAMILY MEDICINE

## 2024-02-01 NOTE — PROGRESS NOTES
Patient ID: [id number]     Chief Complaint: Annual Exam and Medicare AWV (Medicare wellness)      Patient Care Team:  Cherri Watson MD as PCP - General (Family Medicine)  Cherri Watson MD Grisby, Shaunda K, MD as Consulting Physician (Breast Surgery)  Ronal Cooper MD as Consulting Physician (Ophthalmology)     HPI:   Disclaimer:  This note is prepared using voice recognition software and as such is likely to have errors despite attempts at proofreading. Please contact me for questions.     Dodie Brody is a 73 y.o. female here today for a Medicare Wellness. No other complaints today.   The patient states her last eye exam was approximately 2 months ago and her last dental exam was approximately 1 month ago.  She admits to annual eye exams and biannual dental exams.  She declines a repeat DEXA.  Lung Cancer screening: LDCT 09/2023  Cervical Cancer Screening - s/p [facility]  Breast Cancer Screening - Last Mammogram yesterday, BIRADS 1, negative. Repeat in 1 year.  Colon Cancer Screening - Cologuard negative 07/2023.  Osteoporosis Screening - Last DEXA in 01/2022. Patient declined DEXA.  Vaccinations -   Immunization History   Administered Date(s) Administered    COVID-19, MRNA, LN-S, PF (Pfizer) (Gray Cap) 05/09/2022    COVID-19, MRNA, LN-S, PF (Pfizer) (Purple Cap) 03/10/2021, 03/31/2021, 10/01/2021    Influenza (FLUBLOK) - Quadrivalent - Recombinant - PF *Preferred* (egg allergy) 11/01/2021    Influenza - High Dose - PF (65 years and older) 11/12/2015, 09/26/2016    Influenza - Quadrivalent 10/07/2020    Influenza - Quadrivalent - High Dose - PF (65 years and older) 10/05/2022, 10/10/2023    Influenza - Trivalent - PF (ADULT) 09/22/2012, 10/20/2014, 09/27/2017, 10/05/2018, 10/22/2019, 10/07/2020    Influenza Split 09/22/2012    Pneumococcal Conjugate - 13 Valent 01/14/2019    Pneumococcal Conjugate - 20 Valent 02/01/2024        Advance Care Planning     Date: 02/01/2024    Living Will  During this  visit, I engaged the patient  in the voluntary advance care planning process.  The patient and I reviewed the role for advance directives and their purpose in directing future healthcare if the patient's unable to speak for him/herself.  At this point in time, the patient does have full decision-making capacity.  We discussed different extreme health states that she could experience, and reviewed what kind of medical care she would want in those situations.  The patient communicated that if she were comatose and had little chance of a meaningful recovery, she would not want machines/life-sustaining treatments used. The patient has completed a living will to reflect these preferences. Patient's son has copy of will, Ronal WatsonRonn. I spent a total of 5 minutes engaging the patient in this advance care planning discussion.    Medicare Annual Wellness and Personalized Prevention Plan  Fall Risk + Home Safety + Hearing Impairment + Depression Screen + Cognitive Impairment Screen + Health Risk Assessment all reviewed.           2/1/2024    10:15 AM 7/26/2023     9:30 AM 1/26/2023     9:30 AM 7/25/2022     9:15 AM   Fall Risk Assessment - Outpatient   Mobility Status Ambulatory Ambulatory Ambulatory Ambulatory   Number of falls 0 0 0 0   Identified as fall risk False False False False       What [age] 70-79  Are you male or female?: Female  During the past four weeks, how much have you been bothered by emotional problems such as feeling anxious, depressed, irritable, sad, or downhearted and blue?: Not at all  During the past five weeks, has your physical and/or emotional health limited your social activities with family, friends, neighbors, or groups?: Not at all  During the past four weeks, how much bodily pain have you generally had?: No pain  During the past four weeks, was someone available to help if you needed and wanted help?: No, not at all  During the past four weeks, what was the hardest physical  activity you could do for at least two minutes?: Moderate  Can you get to places out of walking distance without help?  (For example, can you travel alone on buses or taxis, or drive your own car?): No  Can you go shopping for groceries or clothes without someone's help?: Yes  Can you prepare your own meals?: Yes  Can you do your own housework without help?: Yes  Because of any health problems, do you need the help of another person with your personal care needs such as eating, bathing, dressing, or getting around the house?: No  Can you handle your own money without help?: Yes  During the past four weeks, how would you rate your health in general?: Good  How have things been going for you during the past four weeks?: Pretty well  Are you having difficulties driving your car?: No  Do you always fasten your seat belt when you are in a car?: Yes, usually  How often in the past four weeks have you been bothered by falling or dizzy when standing up?: Seldom  How often in the past four weeks have you been bothered by sexual problems?: Never  How often in the past four weeks have you been bothered by trouble eating well?: Never  How often in the past four weeks have you been bothered by teeth or denture problems?: Never  How often in the past four weeks have you been bothered with problems using the telephone?: Never  How often in the past four weeks have you been bothered by tiredness or fatigue?: Seldom  Have you fallen two or more times in the past year?: No  Are you afraid of falling?: No  Are you a smoker?: No  During the past four weeks, how many drinks of wine, beer, or other alcoholic beverages did you have?: No alcohol at all  Do you exercise for about 20 minutes three or more days a week?: Yes, some of the time  Have you been given any information to help you with hazards in your house that might hurt you?: No  Have you been given any information to help you with keeping track of your medications?: No  How often  do you have trouble taking medicines the way you've been told to take them?: I always take them as prescribed  How confident are you that you can control and manage most of your health problems?: Very confident  What is your race? (Check all that apply.):    Depression Screening  Over the past two weeks, has the patient felt down, depressed, or hopeless?: No  Over the past two weeks, has the patient felt little interest or pleasure in doing things?: No  Functional Ability/Safety Screening  Was the patient's timed Up & Go test unsteady or longer than 30 seconds?: No  Does the patient need help with phone, transportation, shopping, preparing meals, housework, laundry, meds, or managing money?: No  Does the patient's home have rugs in the hallway, lack grab bars in the bathroom, lack handrails on the stairs or have poor lighting?: No  Have you noticed any hearing difficulties?: No  Cognitive Function (Assessed through direct observation with due consideration of information obtained by way of patient reports and/or concerns raised by family, friends, caretakers, or others)    Does the patient repeat questions/statements in the same day?: No  Does the patient have trouble remembering the date, year, and time?: No  Does the patient have difficulty managing finances?: No  Does the patient have a decreased sense of direction?: No        2/1/2024    10:04 AM 7/26/2023     9:17 AM 1/26/2023     9:22 AM 7/25/2022     9:12 AM   Depression Patient Health Questionnaire   Over the last two weeks how often have you been bothered by little interest or pleasure in doing things Not at all Not at all Not at all Not at all   Over the last two weeks how often have you been bothered by feeling down, depressed or hopeless Not at all Not at all Not at all Not at all   PHQ-2 Total Score 0 0 0 0       Past Medical History:   Diagnosis Date    Arthritis     Breast cancer     Left    Diabetes mellitus     HLD (hyperlipidemia)      Hypertension     Lung mass         Past Surgical History:   Procedure Laterality Date    APPENDECTOMY      BREAST BIOPSY      CHOLECYSTECTOMY      HYSTERECTOMY      left mastectomy      NECK SURGERY      PHACOEMULSIFICATION, CATARACT, WITH IOL INSERTION Right 8/22/2023    Procedure: PHACOEMULSIFICATION, CATARACT, WITH IOL INSERTION- OD;  Surgeon: Ronal Cooper MD;  Location: [facility] OR;  Service: Ophthalmology;  Laterality: Right;    PHACOEMULSIFICATION, CATARACT, WITH IOL INSERTION Left 9/5/2023    Procedure: PHACOEMULSIFICATION, CATARACT, WITH IOL INSERTION- OS;  Surgeon: Ronal Cooper MD;  Location: [facility] OR;  Service: Ophthalmology;  Laterality: Left;    tailbone cyst removal         Review of patient's allergies indicates:   Allergen Reactions    Iodine Shortness Of Breath     Injection  Can eat seafood    Acetaminophen      Other reaction(s): rash    Amlodipine      Other reaction(s): muscle cramps       Outpatient Medications Marked as Taking for the 2/1/24 encounter (Office Visit) with Ximena Mcmahon MD   Medication Sig Dispense Refill    calcium-vitamin D3 (OS-MICHELLE 500 + D3) 500 mg-5 mcg (200 unit) per tablet Take 1 tablet by mouth.      enalapril (VASOTEC) 10 MG tablet Take 1 tablet (10 mg total) by mouth once daily. 90 tablet 3    enalapril (VASOTEC) 20 MG tablet Take 1 tablet (20 mg total) by mouth once daily. 90 tablet 3    metFORMIN (GLUCOPHAGE) 850 MG tablet Take 1 tablet (850 mg total) by mouth daily with breakfast. 90 tablet 3    simvastatin (ZOCOR) 20 MG tablet Take 1 tablet (20 mg total) by mouth once daily. 90 tablet 3     Medication List with Changes/Refills   Current Medications    CALCIUM-VITAMIN D3 (OS-MICHELLE 500 + D3) 500 MG-5 MCG (200 UNIT) PER TABLET    Take 1 tablet by mouth.       Start Date: --        End Date: --    ENALAPRIL (VASOTEC) 10 MG TABLET    Take 1 tablet (10 mg total) by mouth once daily.       Start Date: 3/15/2023 End Date: --    ENALAPRIL (VASOTEC) 20 MG TABLET    Take  "1 tablet (20 mg total) by mouth once daily.       Start Date: 3/3/2023  End Date: --    METFORMIN (GLUCOPHAGE) 850 MG TABLET    Take 1 tablet (850 mg total) by mouth daily with breakfast.       Start Date: 2023  End Date: 2024    SIMVASTATIN (ZOCOR) 20 MG TABLET    Take 1 tablet (20 mg total) by mouth once daily.       Start Date: 3/15/2023 End Date: --       Opioid Screening: Patient medication list reviewed, patient is not taking prescription opioids. Patient is not using additional opioids than prescribed. Patient is at low risk of substance abuse based on this opioid use history.       Social History     Socioeconomic History    Marital status:    Tobacco Use    Smoking status: Former     Current packs/day: 0.00     Types: Cigarettes     Quit date:      Years since quittin.1    Smokeless tobacco: Never    Tobacco comments:     Quit 30yrs ago   Substance and Sexual Activity    Alcohol use: Yes    Drug use: Never        Family History   Problem Relation Age of Onset    Other Mother         heart issues    Lung disease Father         Black lung    Breast cancer Paternal Aunt 72        Subjective:     Review of Systems:   Review of Systems   Constitutional:  Negative for chills, diaphoresis and fever.   Eyes:  Negative for blurred vision and double vision.   Respiratory:  Negative for cough and shortness of breath.    Cardiovascular:  Negative for chest pain and leg swelling.   Gastrointestinal:  Negative for abdominal pain, diarrhea, nausea and vomiting.   Skin:  Negative for rash.   Neurological:  Negative for dizziness, tremors and headaches.      See HPI for details  All Other ROS: Negative except as stated in HPI.       Objective:     Vitals:    24 1009   BP: 136/76   BP Location: Right arm   Patient Position: Sitting   Pulse: 70   Resp: 16   Temp: 97.8 °F (36.6 °C)   TempSrc: Temporal   SpO2: 97%   Weight: 97.1 kg (214 lb)   Height: 5' 1" (1.549 m)        Physical " Exam  Constitutional:       General: She is not in acute distress.     Appearance: She is not toxic-appearing or diaphoretic.   HENT:      Head: Normocephalic and atraumatic.      Right Ear: Tympanic membrane, ear canal and external ear normal. There is no impacted cerumen.      Left Ear: Tympanic membrane, ear canal and external ear normal. There is no impacted cerumen.      Nose: Nose normal.      Mouth/Throat:      Mouth: Mucous membranes are moist.      Pharynx: Oropharynx is clear. No oropharyngeal exudate or posterior oropharyngeal erythema.   Eyes:      General: No scleral icterus.     Extraocular Movements: Extraocular movements intact.      Conjunctiva/sclera: Conjunctivae normal.   Cardiovascular:      Rate and Rhythm: Normal rate and regular rhythm.      Heart sounds: Normal heart sounds. No murmur heard.     No friction rub. No gallop.   Pulmonary:      Effort: Pulmonary effort is normal. No respiratory distress.      Breath sounds: Normal breath sounds. No stridor. No wheezing, rhonchi or rales.   Musculoskeletal:         General: Normal range of motion.      Cervical back: Normal range of motion and neck supple. No rigidity.   Lymphadenopathy:      Cervical: No cervical adenopathy.   Skin:     General: Skin is warm and dry.      Coloration: Skin is not pale.   Neurological:      General: No focal deficit present.      Mental Status: She is alert and oriented to person, place, and time. Mental status is at baseline.   Psychiatric:         Mood and Affect: Mood normal.         Behavior: Behavior normal.         Thought Content: Thought content normal.         Judgment: Judgment normal.       Lab Results   Component Value Date    WBC 7.98 01/23/2024    HGB 14.0 01/23/2024    HCT 42.1 01/23/2024     01/23/2024    CHOL 169 01/23/2024    TRIG 133 01/23/2024    HDL 53 01/23/2024    ALT 12 01/23/2024    AST 17 01/23/2024     01/23/2024    K 4.9 01/23/2024    CREATININE 0.83 01/23/2024    BUN 17.4  01/23/2024    CO2 23 01/23/2024    TSH 3.262 01/23/2024    HGBA1C 6.5 01/23/2024       US AAA Screening  Narrative: EXAMINATION:  US AAA SCREENING    CLINICAL HISTORY:  Encounter for screening for cardiovascular disorders    COMPARISON:  None    FINDINGS:  Transabdominal scanning targeting the aorta.  The abdominal aorta measures up to 1.9 cm proximally, 1.4 cm in its midportion and 1.5 cm distally.  Impression: Negative for abdominal aortic aneurysm.    Electronically signed by: Roland Truong  Date:    02/15/2024  Time:    12:21     Assessment:       ICD-10-CM ICD-9-CM   1. Encounter for Medicare annual wellness exam  Z00.00 V70.0   2. Status post cataract extraction, unspecified laterality  Z98.49 V45.61   3. Hypertension associated with diabetes  E11.59 250.80    I15.2 401.9   4. Hyperlipidemia associated with type 2 diabetes mellitus  E11.69 250.80    E78.5 272.4   5. Aortic atherosclerosis  I70.0 440.0   6. Vitamin D deficiency  E55.9 268.9   7. Class 2 severe obesity due to excess calories with serious comorbidity and body mass index (BMI) of 38.0 to 38.9 in adult  E66.01 278.01    Z68.38 V85.38   8. Need for vaccination for Strep pneumoniae  Z23 V03.82   9. Need for Tdap vaccination  Z23 V06.1        Plan:         Problem List Items Addressed This Visit          Cardiac/Vascular    Hypertension associated with diabetes    Hyperlipidemia associated with type 2 diabetes mellitus    Aortic atherosclerosis       Endocrine    Vitamin D deficiency    Class 2 severe obesity due to excess calories with serious comorbidity and body mass index (BMI) of 38.0 to 38.9 in adult     Other Visit Diagnoses       Encounter for Medicare annual wellness exam    -  Primary    Status post cataract extraction, unspecified laterality        Need for vaccination for Strep pneumoniae        Relevant Orders    (In Office Administered) Pneumococcal Conjugate Vaccine (20 Valent) (IM) (Preferred) (Completed)    Need for Tdap vaccination              1. Encounter for Medicare annual wellness exam  Recommend annual eye exam and biannual dental exams.  Limit caffeine and alcohol intake.  Well balanced diet low in sugar/complex carbohydrates and increased vegetable intake encouraged.  Moderate intensity exercise 30 min/day at least 5 days/Wk (total 150 min/Wk) recommended.  Maintain a healthy BMI.  Wellness labs reviewed in clinic.  See above preventative health screening at today's visit.    2. Status post cataract extraction  Stable  Keep follow up with ophthalmology.    3. Hypertension associated with diabetes  BP at goal  Continue current medications.  Low sodium diet and exercise recommended  Monitor BP at home and notify MD if sBP >160 or <90. Also notify MD if dBP >100 or <60.  Limit caffeine intake.  Seek immediate medical treatment for chest pain, SOB, LE edema, severe headache, blurred vision, dizziness, slurred speech, any new or worsening symptoms.    4. Hyperlipidemia associated with type 2 diabetes mellitus  Continue current medications.  Denies chest pain, SOB, headaches, dizziness, or muscle cramps.  Consider Flax Seed/Fish Oil supplements. Increase dietary fiber intake.  Recommend low fat, low cholesterol diet and exercise.  LDL <100 and total cholesterol <200.    5. Aortic atherosclerosis  Continue ACE-I and statin.  Low fat, cholesterol and sodium diet encouraged.  Avoid smoking.  Encouraged routine exercise.    6. Vitamin D deficiency  Vitamin D level ordered and pending.   Patient will be treated appropriately based on results of testing.  Increase Vitamin D rich food in diet.  Despite vitamin D deficiency patient should continue to wear sunscreen prior to any prolonged sun exposure.    7. Class 2 severe obesity due to excess calories with serious comorbidity and body mass index (BMI) of 38.0 to 38.9 in adult  See #1.    8. Need for vaccination for Strep pneumoniae  - (In Office Administered) Pneumococcal Conjugate Vaccine (20 Valent)  (IM) (Preferred)    9. Need for Tdap vaccination  - diphth,pertus,acell,,tetanus (BOOSTRIX) 2.5-8-5 Lf-mcg-Lf/0.5mL Susp; Inject 0.5 mLs into the muscle once. for 1 dose  Dispense: 0.5 mL; Refill: 0    Follow up in about 4 months (around 6/1/2024).

## 2024-02-07 NOTE — PROGRESS NOTES
[facility] Breast Surg  Breast Surgical Oncology  Follow up Patient Office Visit       Referring Provider: Dr. Cherri Watson in [facility]    PCP: Cherri Watson MD    Chief Complaint:   Chief Complaint   Patient presents with    Follow-up     Patient reports no breast concerns      Subjective:      Interval History:   02/08/2024 Patient here for follow up appointment. She is doing well.  Patient states that she experiences intermittent, achy right breast pain. She locates this in the inframammary fold of her right breast. Patient states that she feels her current prosthetic bra is ill-fitting, and this could be the cause of her breast pain.  She does admit to drinking a lot of caffeine. She currently denies any other breast issues including rashes, redness,  swelling, nipple discharge, or new lumps/masses. She had a Right Breast SCR MG in Jan 2024 that resulted as benign. Patient  has no other questions or concerns today.      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 ER 98.81% and NM 99.27%. She underwent left PM/lumpectomy with oncoplastic reduction on 3/29/2019. Final pathology revealed AJCC 8th ed pathologic anatomic/prognostic stage IA (pT1mi pN0(sn) M0) MICRO-INVASIVE CARCINOMA (1 MM) ARISING IN EXTENSIVE, MULTIFOCAL DUCTAL CARCINOMA IN SITU, LOW GRADE. DCIS IS PRESENT IN THE MEDIAL AND SUPERIOR MARGINS. DCIS IS <1.0 MM FROM THE POSTERIOR MARGIN. TWO SENTINEL LYMPH NODES NEGATIVE FOR MALIGNANCY. ER 91.64%, NM 93.79%, and HER2 dheeraj 0 on IHC (negative). She also had a lipoma present within her breast which was also removed. She is SP completion left breast simple mastectomy on 5/6/2019. Final pathology did not reveal any residual carcinoma.  She does not want to have adjuvant endocrine therapy.       Imagin. 2019 SCR MG at [facility] -which revealed an area of architectural distortion in the upper outer quadrant of the left breast. BIRADS-0: additional imaging was requested.    2.  2019 Left DG MG/US  - which revealed a persistent distortion of the left breast and at 1:00 on ultrasound there was 3.1 cm x 1.9 cm x 0.9 cm irregular area with questionable cystic changes. BIRADS-4: biopsy was recommended    3. 2020 Right SCR MG at [facility] - which revealed no mammographic evidence of malignancy in the right breast. Routine screening mammography in one year is recommended    4. 2022 RIGHT SCR MG at [facility] - which revealed no mammographic evidence of malignancy.  Routine screening mammogram in 1 year is recommended.    5. 2023  Right Breast SCR MG at [place] - Prior Left mastectomy. No new dominant masses or suspicious calcifications noted.SCR MG in one year. BENIGN    6. 2024 R Breast SC MG - There is no mammographic evidence of malignancy. BI-RADS: 1 Negative        OB / GYN History   Menarche Onset : 16 year(s)   Menstrual Comments : 1 pregnancy, 1 child birth   first born at 21  Used BC @ 20  had hysterectomy @ 29 swollen ovaries  Menopause   no hormones used   no treatments used to get pregnant  FH aunt BC, aunt cervical BC    Other History:     Past Medical History:   Diagnosis Date    Arthritis     Breast cancer     Left    Diabetes mellitus     HLD (hyperlipidemia)     Hypertension     Lung mass         Past Surgical History:   Procedure Laterality Date    APPENDECTOMY      BREAST BIOPSY      CHOLECYSTECTOMY      HYSTERECTOMY      left mastectomy      NECK SURGERY      PHACOEMULSIFICATION, CATARACT, WITH IOL INSERTION Right 2023    Procedure: PHACOEMULSIFICATION, CATARACT, WITH IOL INSERTION- OD;  Surgeon: Ronal Cooper MD;  Location: [facility];  Service: Ophthalmology;  Laterality:  Right;    PHACOEMULSIFICATION, CATARACT, WITH IOL INSERTION Left 2023    Procedure: PHACOEMULSIFICATION, CATARACT, WITH IOL INSERTION- OS;  Surgeon: Ronal Cooper MD;  Location: [facility];  Service: Ophthalmology;  Laterality: Left;    tailbone cyst removal          Social History     Socioeconomic History    Marital status:    Tobacco Use    Smoking status: Former     Current packs/day: 0.00     Types: Cigarettes     Quit date:      Years since quittin.1    Smokeless tobacco: Never    Tobacco comments:     Quit 30yrs ago   Substance and Sexual Activity    Alcohol use: Yes    Drug use: Never        Immunization History   Administered Date(s) Administered    COVID-19, MRNA, LN-S, PF (Pfizer) (Gray Cap) 2022    COVID-19, MRNA, LN-S, PF (Pfizer) (Purple Cap) 03/10/2021, 2021, 10/01/2021    Influenza (FLUBLOK) - Quadrivalent - Recombinant - PF *Preferred* (egg allergy) 2021    Influenza - High Dose - PF (65 years and older) 2015, 2016    Influenza - Quadrivalent 10/07/2020    Influenza - Quadrivalent - High Dose - PF (65 years and older) 10/05/2022, 10/10/2023    Influenza - Trivalent - PF (ADULT) 2012, 10/20/2014, 2017, 10/05/2018, 10/22/2019, 10/07/2020    Influenza Split 2012    Pneumococcal Conjugate - 13 Valent 2019    Pneumococcal Conjugate - 20 Valent 2024        Medications/Allergies:    Current Outpatient Medications on File Prior to Visit   Medication Sig Dispense Refill    calcium-vitamin D3 (OS-MICHELLE 500 + D3) 500 mg-5 mcg (200 unit) per tablet Take 1 tablet by mouth.      enalapril (VASOTEC) 10 MG tablet Take 1 tablet (10 mg total) by mouth once daily. 90 tablet 3    enalapril (VASOTEC) 20 MG tablet Take 1 tablet (20 mg total) by mouth once daily. 90 tablet 3    metFORMIN (GLUCOPHAGE) 850 MG tablet Take 1 tablet (850 mg total) by mouth daily with breakfast. 90 tablet 3    simvastatin (ZOCOR) 20 MG tablet Take 1 tablet (20 mg  total) by mouth once daily. 90 tablet 3     No current facility-administered medications on file prior to visit.        Review of patient's allergies indicates:   Allergen Reactions    Iodine Shortness Of Breath     Injection  Can eat seafood    Acetaminophen      Other reaction(s): rash    Amlodipine      Other reaction(s): muscle cramps        Review of Systems:      Comprehensive ROS normal except: see HPI       Objective/Physical Exam     Vitals:    02/08/24 1053   BP: (!) 143/78   Pulse: 69   Resp: 18   Temp: 97.8 °F (36.6 °C)          General: The patient is awake, alert and oriented times three. The patient is well nourished and in no acute distress.  Neck: There is no evidence of palpable cervical, supraclavicular or axillary adenopathy. The neck is supple.   Musculoskeletal: The patient has a normal range of motion of her bilateral upper extremities.  Chest: Examination of the chest wall fails to reveal any obvious abnormalities. Nonlabored breathing, symmetric expansion.  Breast:  Right:  Examination of right breast fails to reveal any dominant masses or areas of significant focal nodularity. The nipple is everted without evidence of discharge. There is no skin dimpling with movement of the pectoralis. There are no significant skin changes overlying the breast.   Left:  Examination of the left mastectomy site fails to reveal any dominant masses or skin changes around the area.   Abdomen: The abdomen is soft, flat, nontender and nondistended.  Integumentary: no rashes or skin lesions present  Neurologic: cranial nerves intact, no signs of peripheral neurological deficit, motor/sensory function intact       Assessment and Plan     Patient Active Problem List   Diagnosis    Postmenopausal    Breast cancer screening by mammogram    Hypertension associated with diabetes    Hyperlipidemia associated with type 2 diabetes mellitus    Diabetes mellitus    Impaired mobility    Malignant neoplasm of breast     Osteopenia    Vitamin D deficiency    Tetanus, diphtheria, and acellular pertussis (Tdap) vaccination declined    Lung mass    Ex-cigarette smoker    Class 2 severe obesity due to excess calories with serious comorbidity and body mass index (BMI) of 38.0 to 38.9 in adult    Advanced care planning/counseling discussion    History of breast cancer    Aortic atherosclerosis    Colon cancer screening    Pre-op exam        Dodie was seen today for follow-up.    Diagnoses and all orders for this visit:    Encounter for follow-up surveillance of breast cancer  -     Mammo Digital Screening Right with [facility]; Future    Personal history of breast cancer  -     Mammo Digital Screening Right with Keegan; Future    Screening mammogram for breast cancer  -     Mammo Digital Screening Right with Keegan; Future         ---------------------------------------------------------------------------------------------------------------    PLAN:      Lifestyle - Healthy lifestyle guidelines were reviewed. She was encouraged to engage in regular exercise, maintain a healthy body weight, and avoid excessive alcohol consumption. Healthy nutritional guidelines were also discussed. Self-breast examination was reviewed with the patient in detail and she was encouraged to perform this on a monthly basis.    Screening - Patient should still undergo annual CBE and annual R SC MG. I will order next R SC MG for Feb 2025. After this, patient's PCP or GYN should continue ordering her annual SC MG.     R Breast Pain -   I discussed ways to reduce breast pain/tenderness. Avoid caffeine (coffee, teas, chocolate, sodas). Drinking alcohol may also increase the risk of fibrocystic changes and breast pain. I also advised to wear a good, supportive bra both day and night when symptoms are worse. Avoid contact sports and other activities which could cause injury to the breasts. She may also consider taking Vitamin E/Primrose Oil supplementation to reduce pain.  She  may consider Voltaren gel as needed for breast pain.   No concerning findings on exam or recent imaging.   We will send prescription for new prosthetic bra.    Follow up - Patient is 5 years out from her left mastectomy surgery in 2019. There were no concerning findings on exam today. Thus, patient can continue following up with her PCP or gynecologist for annual CBE. Patient will follow up with us PRN.         All of her questions were answered.     Era Cid PA-C       Total time on the date of the visit ranged from 30-39 mins (48917). Total time includes both face-to-face and non-face-to-face time personally spent by myself on the day of the visit.    Non-face-to-face time included:  _X_ preparing to see the patient such as reviewing the patient record  __ obtaining and reviewing separately obtained history  _X_ independently interpreting results  _X_ documenting clinical information in electronic health record.    Face-to-face time included:  _X_ performing an appropriate history and examination  _X_ communicating results to the patient  _X_ counseling and educating the patient  __ ordering appropriate medications  __ ordering appropriate tests  _X_ ordering appropriate procedures (including follow-up)  _X_ answering any questions the patient had    Total Time spent on date of visit: 35 minutes

## 2024-02-08 ENCOUNTER — OFFICE VISIT (OUTPATIENT)
Dept: SURGERY | Facility: CLINIC | Age: 74
End: 2024-02-08
Payer: MEDICARE

## 2024-02-08 VITALS
DIASTOLIC BLOOD PRESSURE: 78 MMHG | RESPIRATION RATE: 18 BRPM | HEART RATE: 69 BPM | BODY MASS INDEX: 40.78 KG/M2 | TEMPERATURE: 98 F | OXYGEN SATURATION: 96 % | HEIGHT: 61 IN | SYSTOLIC BLOOD PRESSURE: 143 MMHG | WEIGHT: 216 LBS

## 2024-02-08 DIAGNOSIS — Z85.3 ENCOUNTER FOR FOLLOW-UP SURVEILLANCE OF BREAST CANCER: Primary | ICD-10-CM

## 2024-02-08 DIAGNOSIS — Z12.31 SCREENING MAMMOGRAM FOR BREAST CANCER: ICD-10-CM

## 2024-02-08 DIAGNOSIS — Z08 ENCOUNTER FOR FOLLOW-UP SURVEILLANCE OF BREAST CANCER: Primary | ICD-10-CM

## 2024-02-08 DIAGNOSIS — Z85.3 PERSONAL HISTORY OF BREAST CANCER: ICD-10-CM

## 2024-02-08 PROCEDURE — 99214 OFFICE O/P EST MOD 30 MIN: CPT | Mod: PBBFAC

## 2024-02-08 PROCEDURE — 99999 PR PBB SHADOW E&M-EST. PATIENT-LVL IV: CPT | Mod: PBBFAC,,,

## 2024-02-08 PROCEDURE — 99214 OFFICE O/P EST MOD 30 MIN: CPT | Mod: S$PBB,,,

## 2024-02-15 ENCOUNTER — HOSPITAL ENCOUNTER (OUTPATIENT)
Dept: RADIOLOGY | Facility: HOSPITAL | Age: 74
Discharge: HOME | End: 2024-02-15
Attending: FAMILY MEDICINE
Payer: MEDICARE

## 2024-02-15 DIAGNOSIS — Z13.6 SCREENING FOR AAA (ABDOMINAL AORTIC ANEURYSM): ICD-10-CM

## 2024-02-15 PROCEDURE — 76706 US ABDL AORTA SCREEN AAA: CPT | Mod: TC

## 2024-03-14 DIAGNOSIS — I15.2 HYPERTENSION ASSOCIATED WITH DIABETES: Primary | ICD-10-CM

## 2024-03-14 DIAGNOSIS — E11.59 HYPERTENSION ASSOCIATED WITH DIABETES: Primary | ICD-10-CM

## 2024-03-14 RX ORDER — ENALAPRIL MALEATE 20 MG/1
20 TABLET ORAL DAILY
Qty: 90 TABLET | Refills: 3 | Status: SHIPPED | OUTPATIENT
Start: 2024-03-14

## 2024-03-14 NOTE — TELEPHONE ENCOUNTER
----- Message from Tayo Mcmahon sent at 3/14/2024  8:33 AM CDT -----  .Type:  RX Refill Request    Who Called: pt  Refill or New Rx:Refill  RX Name and Strength:enalapril (VASOTEC) 20 MG tablet  How is the patient currently taking it? (ex. 1XDay):1xday  Is this a 30 day or 90 day RX:90 day  Preferred Pharmacy with phone number:[id number] - [place] - [address] [[id number]]  Local or Mail Order:local  Ordering Provider:  Would the patient rather a call back or a response via [facility]? Call back  Best Call Back Number:  Additional Information:

## 2024-04-08 RX ORDER — ENALAPRIL MALEATE 10 MG/1
10 TABLET ORAL DAILY
Qty: 90 TABLET | Refills: 3 | Status: SHIPPED | OUTPATIENT
Start: 2024-04-08

## 2024-04-08 RX ORDER — SIMVASTATIN 20 MG/1
20 TABLET, FILM COATED ORAL DAILY
Qty: 90 TABLET | Refills: 3 | Status: SHIPPED | OUTPATIENT
Start: 2024-04-08

## 2024-04-08 NOTE — TELEPHONE ENCOUNTER
----- Message from Heather Shaffer sent at 4/8/2024 11:23 AM CDT -----  Type:  RX Refill Request    Who Called:  pt    Refill or New Rx: refill    RX Name and Strength:simvastatin (ZOCOR) 20 MG tablet 90 tablet  RX Name and Strength:enalapril (VASOTEC) 10 MG tablet 90 tablet      [facility]  [address] [facility]    Local or Mail Order: local    Ordering Provider: apple    Would the patient rather a call back or a response via [facility]?  Yes if needed    Best Call Back Number: [phone]    Additional Information:  refill , thanks

## 2024-05-07 ENCOUNTER — OFFICE VISIT (OUTPATIENT)
Dept: FAMILY MEDICINE | Facility: CLINIC | Age: 74
End: 2024-05-07
Payer: MEDICARE

## 2024-05-07 ENCOUNTER — HOSPITAL ENCOUNTER (OUTPATIENT)
Dept: RADIOLOGY | Facility: HOSPITAL | Age: 74
Discharge: HOME | End: 2024-05-07
Attending: FAMILY MEDICINE
Payer: MEDICARE

## 2024-05-07 VITALS
TEMPERATURE: 98 F | OXYGEN SATURATION: 97 % | HEIGHT: 61 IN | WEIGHT: 221.31 LBS | DIASTOLIC BLOOD PRESSURE: 70 MMHG | SYSTOLIC BLOOD PRESSURE: 126 MMHG | RESPIRATION RATE: 18 BRPM | BODY MASS INDEX: 41.78 KG/M2 | HEART RATE: 69 BPM

## 2024-05-07 DIAGNOSIS — M79.675 PAIN AND SWELLING OF TOE OF LEFT FOOT: Primary | ICD-10-CM

## 2024-05-07 DIAGNOSIS — M79.661 BILATERAL CALF PAIN: ICD-10-CM

## 2024-05-07 DIAGNOSIS — M79.89 PAIN AND SWELLING OF TOE OF LEFT FOOT: ICD-10-CM

## 2024-05-07 DIAGNOSIS — M79.675 PAIN AND SWELLING OF TOE OF LEFT FOOT: ICD-10-CM

## 2024-05-07 DIAGNOSIS — M79.662 BILATERAL CALF PAIN: ICD-10-CM

## 2024-05-07 DIAGNOSIS — M79.89 PAIN AND SWELLING OF TOE OF LEFT FOOT: Primary | ICD-10-CM

## 2024-05-07 PROCEDURE — 99213 OFFICE O/P EST LOW 20 MIN: CPT | Mod: ,,, | Performed by: FAMILY MEDICINE

## 2024-05-07 PROCEDURE — 73630 X-RAY EXAM OF FOOT: CPT | Mod: TC,LT

## 2024-05-07 NOTE — PROGRESS NOTES
Subjective:      Patient ID: Dodie Brody is a 73 y.o. female.    Chief Complaint: Pain (Left leg pain 3 weeks ago. Patient reports it's a 10 on pain scale)    Disclaimer:  This note is prepared using voice recognition software and as such is likely to have errors despite attempts at proofreading. Please contact me for questions.     73-year-old female who presents for evaluation of left ankle pain and swelling that radiates superiorly.  She also admits to chronic bilateral calf pain.  She denies any recent falls or injuries.  The patient is currently taking OTC analgesics.  She also denies numbness or tingling.  LLE venous ultrasound negative for DVT in 03/2022 however BLE arterial ultrasound significant for bilateral stenosis at [facility] in 02/2020.    Past Medical History:   Diagnosis Date    Arthritis     Breast cancer     Left    Diabetes mellitus     HLD (hyperlipidemia)     Hypertension     Lung mass         Current Outpatient Medications on File Prior to Visit   Medication Sig Dispense Refill    calcium-vitamin D3 (OS-MICHELLE 500 + D3) 500 mg-5 mcg (200 unit) per tablet Take 1 tablet by mouth.      enalapril (VASOTEC) 10 MG tablet Take 1 tablet (10 mg total) by mouth once daily. 90 tablet 3    enalapril (VASOTEC) 20 MG tablet Take 1 tablet (20 mg total) by mouth once daily. 90 tablet 3    metFORMIN (GLUCOPHAGE) 850 MG tablet Take 1 tablet (850 mg total) by mouth daily with breakfast. 90 tablet 3    simvastatin (ZOCOR) 20 MG tablet Take 1 tablet (20 mg total) by mouth once daily. 90 tablet 3     No current facility-administered medications on file prior to visit.        Review of patient's allergies indicates:   Allergen Reactions    Iodine Shortness Of Breath     Injection  Can eat seafood    Acetaminophen      Other reaction(s): rash    Amlodipine      Other reaction(s): muscle cramps        Review of Systems   Constitutional:  Negative for chills and fever.   Eyes:  Negative for blurred vision and double  "vision.   Respiratory:  Negative for cough and shortness of breath.    Cardiovascular:  Negative for chest pain.   Gastrointestinal:  Negative for abdominal pain, diarrhea, nausea and vomiting.   Musculoskeletal:  Positive for joint pain. Negative for falls.   Skin:  Negative for rash.   Neurological:  Negative for dizziness, tremors and headaches.       Objective:     Vitals:    05/07/24 1310   BP: 126/70   BP Location: Right arm   Patient Position: Sitting   Pulse: 69   Resp: 18   Temp: 97.8 °F (36.6 °C)   TempSrc: Oral   SpO2: 97%   Weight: 100.4 kg (221 lb 4.8 oz)   Height: 5' 1" (1.549 m)     Physical Exam  Vitals reviewed.   Constitutional:       General: She is not in acute distress.     Appearance: Normal appearance. She is not ill-appearing, toxic-appearing or diaphoretic.   HENT:      Head: Normocephalic.   Eyes:      General:         Right eye: No discharge.         Left eye: No discharge.      Extraocular Movements: Extraocular movements intact.      Conjunctiva/sclera: Conjunctivae normal.   Cardiovascular:      Rate and Rhythm: Normal rate and regular rhythm.      Pulses: Normal pulses.      Heart sounds: Normal heart sounds. No murmur heard.     No friction rub. No gallop.   Pulmonary:      Effort: Pulmonary effort is normal. No respiratory distress.      Breath sounds: Normal breath sounds. No stridor. No wheezing, rhonchi or rales.   Musculoskeletal:         General: Normal range of motion.      Cervical back: Normal range of motion and neck supple. No rigidity.      Comments: Mild nonpitting edema of bilateral ankles (patient states is improved from previous). Left foot: Tenderness of the MTPs with 2+ DP pulses.  No palpable cords in BLE and mild tenderness to palpation of calf bilaterally, no swelling.   Skin:     General: Skin is warm and dry.      Coloration: Skin is not pale.   Neurological:      General: No focal deficit present.      Mental Status: She is alert and oriented to person, place, " and time. Mental status is at baseline.   Psychiatric:         Mood and Affect: Mood normal.         Behavior: Behavior normal.         Thought Content: Thought content normal.         Judgment: Judgment normal.         Assessment:     1. Pain and swelling of toe of left foot    2. Bilateral calf pain      Plan:     1. Pain and swelling of toe of left foot  - X-Ray Foot Complete Left; Future    2. Bilateral calf pain   Repeat BLE arterial US as pain has been persistent.  Referral to cardiology if worsening stenosis.    Follow up if symptoms worsen or fail to improve.  Dodie Brody was given education on their disease process and medications.

## 2024-05-09 ENCOUNTER — TELEPHONE (OUTPATIENT)
Dept: FAMILY MEDICINE | Facility: CLINIC | Age: 74
End: 2024-05-09
Payer: MEDICARE

## 2024-05-09 DIAGNOSIS — M10.9 ACUTE GOUT INVOLVING TOE OF LEFT FOOT, UNSPECIFIED CAUSE: Primary | ICD-10-CM

## 2024-05-09 RX ORDER — PREDNISONE 20 MG/1
20 TABLET ORAL DAILY
Qty: 5 TABLET | Refills: 0 | Status: SHIPPED | OUTPATIENT
Start: 2024-05-09 | End: 2024-05-14

## 2024-05-09 RX ORDER — MELOXICAM 15 MG/1
15 TABLET ORAL DAILY
Qty: 10 TABLET | Refills: 0 | Status: SHIPPED | OUTPATIENT
Start: 2024-05-09 | End: 2024-06-03

## 2024-05-09 NOTE — TELEPHONE ENCOUNTER
----- Message from Adelita Simpson sent at 5/8/2024  3:57 PM CDT -----  Regarding: results  .Type:  Test Results    Who Called: pt    Name of Test (Lab/Mammo/Etc): xray  Date of Test: 05/07/2024  Ordering Provider: Dr. Mcmahon  Where the test was performed:   Would the patient rather a call back or a response via [facility]?     Best Call Back Number: [phone]    Additional Information:  pt is calling to receive test results; please advise. Thanks.

## 2024-05-16 ENCOUNTER — HOSPITAL ENCOUNTER (OUTPATIENT)
Dept: CARDIOLOGY | Facility: HOSPITAL | Age: 74
Discharge: HOME | End: 2024-05-16
Attending: FAMILY MEDICINE
Payer: MEDICARE

## 2024-05-16 DIAGNOSIS — M79.605 PAIN IN BOTH LOWER EXTREMITIES: ICD-10-CM

## 2024-05-16 DIAGNOSIS — M79.661 BILATERAL CALF PAIN: ICD-10-CM

## 2024-05-16 DIAGNOSIS — M79.604 PAIN IN BOTH LOWER EXTREMITIES: ICD-10-CM

## 2024-05-16 DIAGNOSIS — M79.662 BILATERAL CALF PAIN: ICD-10-CM

## 2024-05-16 PROCEDURE — 93922 UPR/L XTREMITY ART 2 LEVELS: CPT

## 2024-05-16 PROCEDURE — 93925 LOWER EXTREMITY STUDY: CPT | Mod: 26,,, | Performed by: SURGERY

## 2024-05-16 PROCEDURE — 93922 UPR/L XTREMITY ART 2 LEVELS: CPT | Mod: 26,,, | Performed by: SURGERY

## 2024-05-16 PROCEDURE — 93925 LOWER EXTREMITY STUDY: CPT

## 2024-05-17 LAB
LEFT ABI: 0.62
LEFT CALF BP: 123 MMHG
LEFT DORSALIS PEDIS: 102 MMHG
LEFT LOWER LEG BP: 124 MMHG
LEFT POSTERIOR TIBIAL: 109 MMHG
OHS CV LEFT LOWER EXTREMITY ABI (NO CALC): 0.58
OHS CV RIGHT ABI LOWER EXTREMITY (NO CALC): 0.55
RIGHT ABI: 0.55
RIGHT ARM BP: 177 MMHG
RIGHT CALF BP: 115 MMHG
RIGHT DORSALIS PEDIS: 98 MMHG
RIGHT LOWER LEG BP: 110 MMHG
RIGHT POSTERIOR TIBIAL: 94 MMHG

## 2024-05-20 ENCOUNTER — TELEPHONE (OUTPATIENT)
Dept: FAMILY MEDICINE | Facility: CLINIC | Age: 74
End: 2024-05-20
Payer: MEDICARE

## 2024-05-20 DIAGNOSIS — I73.9 PAD (PERIPHERAL ARTERY DISEASE): Primary | ICD-10-CM

## 2024-05-20 NOTE — TELEPHONE ENCOUNTER
----- Message from Ximena Mcmahon MD sent at 5/20/2024 10:36 AM CDT -----  US results: Concern for moderately severe arterial flow and multilevel disease. Referral to cardiology sent.

## 2024-05-23 ENCOUNTER — TELEPHONE (OUTPATIENT)
Dept: FAMILY MEDICINE | Facility: CLINIC | Age: 74
End: 2024-05-23
Payer: MEDICARE

## 2024-05-25 DIAGNOSIS — E11.65 TYPE 2 DIABETES MELLITUS WITH HYPERGLYCEMIA, WITHOUT LONG-TERM CURRENT USE OF INSULIN: ICD-10-CM

## 2024-05-27 RX ORDER — METFORMIN HYDROCHLORIDE 850 MG/1
TABLET ORAL
Qty: 90 TABLET | Refills: 0 | Status: SHIPPED | OUTPATIENT
Start: 2024-05-27

## 2024-06-03 ENCOUNTER — OFFICE VISIT (OUTPATIENT)
Dept: FAMILY MEDICINE | Facility: CLINIC | Age: 74
End: 2024-06-03
Payer: MEDICARE

## 2024-06-03 ENCOUNTER — DOCUMENTATION ONLY (OUTPATIENT)
Dept: FAMILY MEDICINE | Facility: CLINIC | Age: 74
End: 2024-06-03

## 2024-06-03 ENCOUNTER — LAB VISIT (OUTPATIENT)
Dept: LAB | Facility: HOSPITAL | Age: 74
End: 2024-06-03
Attending: FAMILY MEDICINE
Payer: MEDICARE

## 2024-06-03 VITALS
HEIGHT: 61 IN | TEMPERATURE: 98 F | HEART RATE: 60 BPM | SYSTOLIC BLOOD PRESSURE: 138 MMHG | RESPIRATION RATE: 16 BRPM | OXYGEN SATURATION: 98 % | BODY MASS INDEX: 40.99 KG/M2 | WEIGHT: 217.13 LBS | DIASTOLIC BLOOD PRESSURE: 74 MMHG

## 2024-06-03 DIAGNOSIS — I15.2 HYPERTENSION ASSOCIATED WITH DIABETES: ICD-10-CM

## 2024-06-03 DIAGNOSIS — R53.83 FATIGUE, UNSPECIFIED TYPE: ICD-10-CM

## 2024-06-03 DIAGNOSIS — E78.5 HYPERLIPIDEMIA ASSOCIATED WITH TYPE 2 DIABETES MELLITUS: ICD-10-CM

## 2024-06-03 DIAGNOSIS — E11.65 TYPE 2 DIABETES MELLITUS WITH HYPERGLYCEMIA, WITHOUT LONG-TERM CURRENT USE OF INSULIN: ICD-10-CM

## 2024-06-03 DIAGNOSIS — Z00.00 WELLNESS EXAMINATION: ICD-10-CM

## 2024-06-03 DIAGNOSIS — E11.65 TYPE 2 DIABETES MELLITUS WITH HYPERGLYCEMIA, WITHOUT LONG-TERM CURRENT USE OF INSULIN: Primary | ICD-10-CM

## 2024-06-03 DIAGNOSIS — E11.69 HYPERLIPIDEMIA ASSOCIATED WITH TYPE 2 DIABETES MELLITUS: ICD-10-CM

## 2024-06-03 DIAGNOSIS — E11.59 HYPERTENSION ASSOCIATED WITH DIABETES: ICD-10-CM

## 2024-06-03 DIAGNOSIS — E66.01 CLASS 3 SEVERE OBESITY DUE TO EXCESS CALORIES WITH SERIOUS COMORBIDITY AND BODY MASS INDEX (BMI) OF 40.0 TO 44.9 IN ADULT: ICD-10-CM

## 2024-06-03 DIAGNOSIS — Z74.09 IMPAIRED MOBILITY: ICD-10-CM

## 2024-06-03 PROBLEM — E66.813 CLASS 3 SEVERE OBESITY DUE TO EXCESS CALORIES WITH SERIOUS COMORBIDITY AND BODY MASS INDEX (BMI) OF 40.0 TO 44.9 IN ADULT: Status: ACTIVE | Noted: 2022-07-25

## 2024-06-03 PROBLEM — Z01.818 PRE-OP EXAM: Status: RESOLVED | Noted: 2023-07-26 | Resolved: 2024-06-03

## 2024-06-03 LAB
ALBUMIN SERPL-MCNC: 4.2 G/DL (ref 3.4–4.8)
ALBUMIN/GLOB SERPL: 1.2 RATIO (ref 1.1–2)
ALP SERPL-CCNC: 58 UNIT/L (ref 40–150)
ALT SERPL-CCNC: 14 UNIT/L (ref 0–55)
ANION GAP SERPL CALC-SCNC: 10 MEQ/L
AST SERPL-CCNC: 17 UNIT/L (ref 5–34)
BILIRUB SERPL-MCNC: 0.6 MG/DL
BUN SERPL-MCNC: 14 MG/DL (ref 9.8–20.1)
CALCIUM SERPL-MCNC: 10 MG/DL (ref 8.4–10.2)
CHLORIDE SERPL-SCNC: 107 MMOL/L (ref 98–107)
CO2 SERPL-SCNC: 24 MMOL/L (ref 23–31)
CREAT SERPL-MCNC: 0.79 MG/DL (ref 0.55–1.02)
CREAT/UREA NIT SERPL: 18
EST. AVERAGE GLUCOSE BLD GHB EST-MCNC: 137 MG/DL
GFR SERPLBLD CREATININE-BSD FMLA CKD-EPI: >60 ML/MIN/1.73/M2
GLOBULIN SER-MCNC: 3.6 GM/DL (ref 2.4–3.5)
GLUCOSE SERPL-MCNC: 121 MG/DL (ref 82–115)
HBA1C MFR BLD: 6.4 %
LEFT EYE DM RETINOPATHY: NEGATIVE
POTASSIUM SERPL-SCNC: 4.4 MMOL/L (ref 3.5–5.1)
PROT SERPL-MCNC: 7.8 GM/DL (ref 5.8–7.6)
RIGHT EYE DM RETINOPATHY: NEGATIVE
SODIUM SERPL-SCNC: 141 MMOL/L (ref 136–145)
VIT B12 SERPL-MCNC: 985 PG/ML (ref 213–816)

## 2024-06-03 PROCEDURE — 83036 HEMOGLOBIN GLYCOSYLATED A1C: CPT

## 2024-06-03 PROCEDURE — 36415 COLL VENOUS BLD VENIPUNCTURE: CPT

## 2024-06-03 PROCEDURE — 82607 VITAMIN B-12: CPT

## 2024-06-03 PROCEDURE — 99214 OFFICE O/P EST MOD 30 MIN: CPT | Mod: ,,, | Performed by: FAMILY MEDICINE

## 2024-06-03 PROCEDURE — 80053 COMPREHEN METABOLIC PANEL: CPT

## 2024-06-03 RX ORDER — AMOXICILLIN 500 MG
CAPSULE ORAL DAILY
COMMUNITY

## 2024-06-03 NOTE — PROGRESS NOTES
Subjective:        Patient ID: Dodie Brody is a 73 y.o. female.    Chief Complaint: Follow-up (DM follow up/)      presents to the clinic unaccompanied for dmii follow up. She is due for her wellness visit in january.      She c/o fatigue.  Would like to check her b12 level.        Did golo.  Has lost 4# on this. She has diabetes. Currently she is on metformin 850mg PO qAm.  She is trying her best to follow a diabetic diet.  Her last foot exam today. Her eye exams are with by Dr. Huertas 10/2024. her last A1c 6.5 1/2024.  urine micro 1/2024.       The patient was diagnosed with breast cancer per her screening mammogram in March 2019. This was abnormal and resulted in a biopsy, subsequent lumpectomy of her left breast and then left mastectomy on May 6, 2019 with Dr. Rios. She reports she will not need chemotherapy or radiation. last visit 8/2020. last mammogram 1/2024. she does these at [facility] [place].     She has hypertension. The patient is on enalapril 20mg in am +10 mg in pm for a total of 30 mg daily. she has a bp machine at home. She is taking her medications as prescribed. she fills this at [facility]. She declines asa. She also has hyperlipidemia and is on simvastatin 20 mg. Is is on omega3.  She is now seeing cards for blockage in her legs. Will be seeing cis. Does not have appt yet.      She has low vitamin D3 1000IU and supplements over-the-counter. Was wnl 1/2022.  She does not want to check this level because medicare does not cover the lab even on a wellness.      She is ALLERGIC to Norvasc and tylenol. completed covid series and booster.      She is a former smoker. quit >17 years ago. Had ct lung cancer screening done 8/2018, 8/2019, 8/2020, 9/2021, 7/2022 which showed stability of lung nodules and recommended repeat in 1year.  When I went to order her repeat for 2023, computer says is not covered by insurance so patient declines     She declines shingles and tetanus and pneumonia vaccination. She had  "cologuard 3/2020.  Will order repeat. She has had a hysterectomy in her late 20s for noncancerous reasons and therefore no longer does pap smears. dexa 2020 showed osteopenia- taking her vitamin d and has not been taking calcium. repeat dexa done 2022.    Had cataract surgery with dr. Cooper.  Doing well.       she is mobility impaired and has tag. cannot walk more than 200ft without stopping to rest.  Asking for renewal for 11/15/24.        Review of Systems   Constitutional:  Positive for fatigue.   HENT: Negative.     Respiratory: Negative.     Cardiovascular: Negative.    Gastrointestinal: Negative.    Genitourinary: Negative.          Review of patient's allergies indicates:   Allergen Reactions    Iodine Shortness Of Breath     Injection  Can eat seafood    Acetaminophen      Other reaction(s): rash    Amlodipine      Other reaction(s): muscle cramps      Vitals:    24 0836 24 0916   BP: (!) 150/74 138/74   BP Location: Left arm    Pulse: 60    Resp: 16    Temp: 97.6 °F (36.4 °C)    TempSrc: Temporal    SpO2: 98%    Weight: 98.5 kg (217 lb 1.6 oz)    Height: 5' 1" (1.549 m)       Social History     Socioeconomic History    Marital status:    Tobacco Use    Smoking status: Former     Current packs/day: 0.00     Types: Cigarettes     Quit date:      Years since quittin.4    Smokeless tobacco: Never    Tobacco comments:     Quit 20 years ago   Substance and Sexual Activity    Alcohol use: Yes     Comment: Once a year- on occasions    Drug use: Never    Sexual activity: Yes     Partners: Male     Birth control/protection: None     Social Determinants of Health     Financial Resource Strain: Low Risk  (2024)    Overall Financial Resource Strain (CARDIA)     Difficulty of Paying Living Expenses: Not very hard   Food Insecurity: No Food Insecurity (2024)    Hunger Vital Sign     Worried About Running Out of Food in the Last Year: Never true     Ran Out of Food in the Last Year: " Never true   Physical Activity: Insufficiently Active (6/2/2024)    Exercise Vital Sign     Days of Exercise per Week: 2 days     Minutes of Exercise per Session: 20 min   Stress: No Stress Concern Present (6/2/2024)    [employer] - Occupational Stress Questionnaire     Feeling of Stress : Not at all   Housing Stability: Unknown (6/2/2024)    Housing Stability Vital Sign     Unable to Pay for Housing in the Last Year: No      Family History   Problem Relation Name Age of Onset    Other Mother Emily Hernandez         heart issues    Heart disease Mother Emily Hernandez     Hypertension Mother Emily Hernandez     Lung disease Father          Black lung    Breast cancer Paternal Aunt  72    Arthritis Maternal Grandmother Lakisha Hull     Cancer Maternal Aunt Emily alvarado     Diabetes Maternal Aunt Jhonatan           Objective:     Physical Exam  Vitals and nursing note reviewed.   Constitutional:       Appearance: Normal appearance. She is obese.   HENT:      Head: Normocephalic and atraumatic.      Nose: Nose normal.      Mouth/Throat:      Mouth: Mucous membranes are moist.      Pharynx: Oropharynx is clear.   Eyes:      Extraocular Movements: Extraocular movements intact.   Cardiovascular:      Rate and Rhythm: Normal rate and regular rhythm.      Pulses: Normal pulses.           Dorsalis pedis pulses are 2+ on the right side and 2+ on the left side.        Posterior tibial pulses are 2+ on the right side and 2+ on the left side.      Heart sounds: Normal heart sounds.   Pulmonary:      Effort: Pulmonary effort is normal.      Breath sounds: Normal breath sounds.   Musculoskeletal:         General: Normal range of motion.      Cervical back: Normal range of motion.        Feet:    Feet:      Right foot:      Protective Sensation: 8 sites tested.  8 sites sensed.      Skin integrity: Skin integrity normal.      Left foot:      Protective Sensation: 8 sites tested.  8 sites sensed.      Skin integrity:  Skin integrity normal.   Skin:     General: Skin is warm and dry.   Neurological:      General: No focal deficit present.      Mental Status: She is alert and oriented to person, place, and time. Mental status is at baseline.   Psychiatric:         Mood and Affect: Mood normal.       Current Outpatient Medications on File Prior to Visit   Medication Sig Dispense Refill    calcium-vitamin D3 (OS-MICHELLE 500 + D3) 500 mg-5 mcg (200 unit) per tablet Take 1 tablet by mouth.      enalapril (VASOTEC) 10 MG tablet Take 1 tablet (10 mg total) by mouth once daily. 90 tablet 3    enalapril (VASOTEC) 20 MG tablet Take 1 tablet (20 mg total) by mouth once daily. 90 tablet 3    metFORMIN (GLUCOPHAGE) 850 MG tablet TAKE ONE TABLET BY MOUTH EVERY MORNING WITH BREAKFAST 90 tablet 0    simvastatin (ZOCOR) 20 MG tablet Take 1 tablet (20 mg total) by mouth once daily. 90 tablet 3    omega-3 fatty acids/fish oil (FISH OIL-OMEGA-3 FATTY ACIDS) 300-1,000 mg capsule Take by mouth once daily.      [DISCONTINUED] meloxicam (MOBIC) 15 MG tablet Take 1 tablet (15 mg total) by mouth once daily. 10 tablet 0     No current facility-administered medications on file prior to visit.     Health Maintenance   Topic Date Due    Eye Exam  07/17/2024    TETANUS VACCINE  06/03/2025 (Originally 11/3/1968)    Shingles Vaccine (1 of 2) 06/03/2025 (Originally 11/3/1969)    Hemoglobin A1c  07/23/2024    Lipid Panel  01/23/2025    DEXA Scan  01/27/2025    Mammogram  01/31/2025    High Dose Statin  05/09/2025    Foot Exam  06/03/2025    Colorectal Cancer Screening  07/30/2026    Hepatitis C Screening  Completed      Results for orders placed or performed during the hospital encounter of 05/16/24   Ankle Brachial Indices (DENNIS)   Result Value Ref Range    Right arm .00 mmHg    Right low thigh .00 mmHg    Right calf .00 mmHg    Right posterior tibial 94.00 mmHg    Right dorsalis pedis 98.00 mmHg    Right DENNIS 0.55     Left low thigh .00 mmHg     Left calf .00 mmHg    Left posterior tibial 109.00 mmHg    Left dorsalis pedis 102.00 mmHg    Left DENNIS 0.62           Assessment & Plan:     Active Problem List with Overview Notes    Diagnosis Date Noted    Fatigue 06/03/2024    Aortic atherosclerosis 07/26/2023    Colon cancer screening 07/26/2023    Advanced care planning/counseling discussion 01/26/2023    History of breast cancer 01/26/2023    Postmenopausal 07/25/2022    Breast cancer screening by mammogram 07/25/2022    Hypertension associated with diabetes 07/25/2022    Hyperlipidemia associated with type 2 diabetes mellitus 07/25/2022    Diabetes mellitus 07/25/2022    Impaired mobility 07/25/2022    Malignant neoplasm of breast 07/25/2022    Osteopenia 07/25/2022    Vitamin D deficiency 07/25/2022    Tetanus, diphtheria, and acellular pertussis (Tdap) vaccination declined 07/25/2022    Lung mass 07/25/2022    Ex-cigarette smoker 07/25/2022    Class 3 severe obesity due to excess calories with serious comorbidity and body mass index (BMI) of 40.0 to 44.9 in adult 07/25/2022       1. Type 2 diabetes mellitus with hyperglycemia, without long-term current use of insulin  Assessment & Plan:  Lab Results   Component Value Date    HGBA1C 6.5 01/23/2024     Urine micro 1/2024  Foot exam today  Eye exam with dr. Huertas. Will request    On metformin 850 mg qday.  On acei and statin.      Labs ordered. Will call with results when available.     Continue to follow dmii diet. Continue to monitor cbgs.   Contact clinic for concerns.     Orders:  -     Comprehensive Metabolic Panel; Future; Expected date: 06/03/2024  -     Hemoglobin A1C; Future; Expected date: 06/03/2024  -     Vitamin B12; Future; Expected date: 06/03/2024  -     CBC Auto Differential; Future; Expected date: 01/03/2025  -     Comprehensive Metabolic Panel; Future; Expected date: 01/03/2025  -     Lipid Panel; Future; Expected date: 01/03/2025  -     TSH; Future; Expected date: 01/03/2025  -      "Hemoglobin A1C; Future; Expected date: 01/03/2025  -     Urinalysis; Future; Expected date: 01/03/2025  -     Microalbumin/Creatinine Ratio, Urine; Future; Expected date: 01/03/2025    2. Hypertension associated with diabetes  Assessment & Plan:  Initial reading elevated. Repeat improved to wnl.  continue on current prescription meds.     Orders:  -     Comprehensive Metabolic Panel; Future; Expected date: 06/03/2024  -     Hemoglobin A1C; Future; Expected date: 06/03/2024  -     Vitamin B12; Future; Expected date: 06/03/2024  -     CBC Auto Differential; Future; Expected date: 01/03/2025  -     Comprehensive Metabolic Panel; Future; Expected date: 01/03/2025  -     Lipid Panel; Future; Expected date: 01/03/2025  -     TSH; Future; Expected date: 01/03/2025  -     Hemoglobin A1C; Future; Expected date: 01/03/2025  -     Urinalysis; Future; Expected date: 01/03/2025  -     Microalbumin/Creatinine Ratio, Urine; Future; Expected date: 01/03/2025    3. Hyperlipidemia associated with type 2 diabetes mellitus  Assessment & Plan:  On statin and fish oil    Lab Results   Component Value Date    CHOL 169 01/23/2024    CHOL 159 01/19/2023    CHOL 161 07/20/2022     Lab Results   Component Value Date    HDL 53 01/23/2024    HDL 47 01/19/2023    HDL 43 07/20/2022     No results found for: "LDLCALC"  Lab Results   Component Value Date    TRIG 133 01/23/2024    TRIG 109 01/19/2023    TRIG 113 07/20/2022       No results found for: "CHOLHDL"      Orders:  -     Comprehensive Metabolic Panel; Future; Expected date: 06/03/2024  -     Hemoglobin A1C; Future; Expected date: 06/03/2024  -     Vitamin B12; Future; Expected date: 06/03/2024  -     CBC Auto Differential; Future; Expected date: 01/03/2025  -     Comprehensive Metabolic Panel; Future; Expected date: 01/03/2025  -     Lipid Panel; Future; Expected date: 01/03/2025  -     TSH; Future; Expected date: 01/03/2025  -     Hemoglobin A1C; Future; Expected date: 01/03/2025  -     " Urinalysis; Future; Expected date: 01/03/2025  -     Microalbumin/Creatinine Ratio, Urine; Future; Expected date: 01/03/2025    4. Fatigue, unspecified type  Assessment & Plan:  Will check b12 level as requested. Will call with results when available.    Orders:  -     Comprehensive Metabolic Panel; Future; Expected date: 06/03/2024  -     Hemoglobin A1C; Future; Expected date: 06/03/2024  -     Vitamin B12; Future; Expected date: 06/03/2024  -     CBC Auto Differential; Future; Expected date: 01/03/2025  -     Comprehensive Metabolic Panel; Future; Expected date: 01/03/2025  -     Lipid Panel; Future; Expected date: 01/03/2025  -     TSH; Future; Expected date: 01/03/2025  -     Hemoglobin A1C; Future; Expected date: 01/03/2025  -     Urinalysis; Future; Expected date: 01/03/2025  -     Microalbumin/Creatinine Ratio, Urine; Future; Expected date: 01/03/2025    5. Class 3 severe obesity due to excess calories with serious comorbidity and body mass index (BMI) of 40.0 to 44.9 in adult  Assessment & Plan:  Encouraged lifestyle change      6. Impaired mobility  Assessment & Plan:  Handicap tag renewed      7. Wellness examination  -     CBC Auto Differential; Future; Expected date: 01/03/2025  -     Comprehensive Metabolic Panel; Future; Expected date: 01/03/2025  -     Lipid Panel; Future; Expected date: 01/03/2025  -     TSH; Future; Expected date: 01/03/2025  -     Hemoglobin A1C; Future; Expected date: 01/03/2025  -     Urinalysis; Future; Expected date: 01/03/2025  -     Microalbumin/Creatinine Ratio, Urine; Future; Expected date: 01/03/2025         Follow up in about 7 months (around 1/3/2025) for Medicare Wellness with labs.

## 2024-06-03 NOTE — PROGRESS NOTES
Please inform patient of results.    1. B12 is elevated.  Avoid b12 supplements. Encourage fluids. Monitor  2.  Spot glucose was 121 and A1c was 6.4- better than previous. Good job.  Continue on current meds. Work on diet/exercise.     Other labwork within acceptable ranges.

## 2024-06-03 NOTE — ASSESSMENT & PLAN NOTE
Lab Results   Component Value Date    HGBA1C 6.5 01/23/2024     Urine micro 1/2024  Foot exam today  Eye exam with dr. Huertas. Will request    On metformin 850 mg qday.  On acei and statin.      Labs ordered. Will call with results when available.     Continue to follow dmii diet. Continue to monitor cbgs.   Contact clinic for concerns.

## 2024-06-03 NOTE — ASSESSMENT & PLAN NOTE
"On statin and fish oil    Lab Results   Component Value Date    CHOL 169 01/23/2024    CHOL 159 01/19/2023    CHOL 161 07/20/2022     Lab Results   Component Value Date    HDL 53 01/23/2024    HDL 47 01/19/2023    HDL 43 07/20/2022     No results found for: "LDLCALC"  Lab Results   Component Value Date    TRIG 133 01/23/2024    TRIG 109 01/19/2023    TRIG 113 07/20/2022       No results found for: "CHOLHDL"    "

## 2024-07-17 ENCOUNTER — TELEPHONE (OUTPATIENT)
Dept: FAMILY MEDICINE | Facility: CLINIC | Age: 74
End: 2024-07-17
Payer: MEDICARE

## 2024-07-17 NOTE — TELEPHONE ENCOUNTER
----- Message from Tayo Mcmahon sent at 7/17/2024  3:16 PM CDT -----  .Who Called: Dodie DANIELA Brody    Caller is requesting information on test results.    Name of Test (Lab/Mammo/Etc): X-ray  Date of Test: 05/07/24  Where the test was performed:   Ordering Provider: Ximena Mcmahon MD    Preferred Method of Contact: Phone Call  Patient's Preferred Phone Number on File: [phone]   Best Call Back Number, if different:  Additional Information: pt called stating Cardiologist of [facility] requesting recent imaging, Dr. Rell Beebe Fax:[phone]

## 2024-08-23 DIAGNOSIS — E11.65 TYPE 2 DIABETES MELLITUS WITH HYPERGLYCEMIA, WITHOUT LONG-TERM CURRENT USE OF INSULIN: ICD-10-CM

## 2024-08-23 RX ORDER — METFORMIN HYDROCHLORIDE 850 MG/1
TABLET ORAL
Qty: 90 TABLET | Refills: 3 | Status: SHIPPED | OUTPATIENT
Start: 2024-08-23

## 2024-09-26 ENCOUNTER — LAB VISIT (OUTPATIENT)
Dept: LAB | Facility: HOSPITAL | Age: 74
End: 2024-09-26
Attending: INTERNAL MEDICINE
Payer: MEDICARE

## 2024-09-26 DIAGNOSIS — R94.39 ABNORMAL BALLISTOCARDIOGRAM: Primary | ICD-10-CM

## 2024-09-26 DIAGNOSIS — I73.9 PERIPHERAL VASCULAR DISEASE, UNSPECIFIED: ICD-10-CM

## 2024-09-26 LAB
ANION GAP SERPL CALC-SCNC: 6 MEQ/L
BUN SERPL-MCNC: 11.2 MG/DL (ref 9.8–20.1)
CALCIUM SERPL-MCNC: 10.1 MG/DL (ref 8.4–10.2)
CHLORIDE SERPL-SCNC: 104 MMOL/L (ref 98–107)
CO2 SERPL-SCNC: 27 MMOL/L (ref 23–31)
CREAT SERPL-MCNC: 0.76 MG/DL (ref 0.55–1.02)
CREAT/UREA NIT SERPL: 15
GFR SERPLBLD CREATININE-BSD FMLA CKD-EPI: >60 ML/MIN/1.73/M2
GLUCOSE SERPL-MCNC: 139 MG/DL (ref 82–115)
POTASSIUM SERPL-SCNC: 5 MMOL/L (ref 3.5–5.1)
SODIUM SERPL-SCNC: 137 MMOL/L (ref 136–145)

## 2024-09-26 PROCEDURE — 36415 COLL VENOUS BLD VENIPUNCTURE: CPT

## 2024-09-26 PROCEDURE — 80048 BASIC METABOLIC PNL TOTAL CA: CPT

## 2024-10-01 ENCOUNTER — HOSPITAL ENCOUNTER (OUTPATIENT)
Facility: HOSPITAL | Age: 74
Discharge: HOME | End: 2024-10-01
Attending: INTERNAL MEDICINE | Admitting: INTERNAL MEDICINE
Payer: MEDICARE

## 2024-10-01 DIAGNOSIS — R07.9 CHEST PAIN: ICD-10-CM

## 2024-10-01 DIAGNOSIS — R93.1 ABNORMAL ECHOCARDIOGRAM: ICD-10-CM

## 2024-10-01 DIAGNOSIS — R94.8 ABNORMAL POSITRON EMISSION TOMOGRAPHY (PET) SCAN: ICD-10-CM

## 2024-10-01 LAB
OHS QRS DURATION: 92 MS
OHS QTC CALCULATION: 429 MS
POCT GLUCOSE: 293 MG/DL (ref 70–110)

## 2024-10-01 PROCEDURE — 25000003 PHARM REV CODE 250: Performed by: INTERNAL MEDICINE

## 2024-10-01 PROCEDURE — C1887 CATHETER, GUIDING: HCPCS | Performed by: INTERNAL MEDICINE

## 2024-10-01 PROCEDURE — C1725 CATH, TRANSLUMIN NON-LASER: HCPCS | Performed by: INTERNAL MEDICINE

## 2024-10-01 PROCEDURE — 93010 ELECTROCARDIOGRAM REPORT: CPT | Mod: ,,, | Performed by: INTERNAL MEDICINE

## 2024-10-01 PROCEDURE — 27201423 OPTIME MED/SURG SUP & DEVICES STERILE SUPPLY: Performed by: INTERNAL MEDICINE

## 2024-10-01 PROCEDURE — 93458 L HRT ARTERY/VENTRICLE ANGIO: CPT | Performed by: INTERNAL MEDICINE

## 2024-10-01 PROCEDURE — C1894 INTRO/SHEATH, NON-LASER: HCPCS | Performed by: INTERNAL MEDICINE

## 2024-10-01 PROCEDURE — 93005 ELECTROCARDIOGRAM TRACING: CPT

## 2024-10-01 PROCEDURE — 63600175 PHARM REV CODE 636 W HCPCS: Mod: JZ,JG | Performed by: INTERNAL MEDICINE

## 2024-10-01 PROCEDURE — 99152 MOD SED SAME PHYS/QHP 5/>YRS: CPT | Performed by: INTERNAL MEDICINE

## 2024-10-01 PROCEDURE — C1769 GUIDE WIRE: HCPCS | Performed by: INTERNAL MEDICINE

## 2024-10-01 PROCEDURE — 99153 MOD SED SAME PHYS/QHP EA: CPT | Performed by: INTERNAL MEDICINE

## 2024-10-01 RX ORDER — HYDRALAZINE HYDROCHLORIDE 20 MG/ML
10 INJECTION INTRAMUSCULAR; INTRAVENOUS EVERY 4 HOURS PRN
Status: DISCONTINUED | OUTPATIENT
Start: 2024-10-01 | End: 2024-10-01 | Stop reason: HOSPADM

## 2024-10-01 RX ORDER — SODIUM CHLORIDE 9 MG/ML
INJECTION, SOLUTION INTRAVENOUS CONTINUOUS
Status: DISCONTINUED | OUTPATIENT
Start: 2024-10-01 | End: 2024-10-01 | Stop reason: HOSPADM

## 2024-10-01 RX ORDER — FAMOTIDINE 10 MG/ML
20 INJECTION INTRAVENOUS
Status: COMPLETED | OUTPATIENT
Start: 2024-10-01 | End: 2024-10-01

## 2024-10-01 RX ORDER — MORPHINE SULFATE 4 MG/ML
2 INJECTION, SOLUTION INTRAMUSCULAR; INTRAVENOUS EVERY 4 HOURS PRN
Status: DISCONTINUED | OUTPATIENT
Start: 2024-10-01 | End: 2024-10-01 | Stop reason: HOSPADM

## 2024-10-01 RX ORDER — CLOPIDOGREL BISULFATE 75 MG/1
75 TABLET ORAL DAILY
COMMUNITY
Start: 2024-08-29

## 2024-10-01 RX ORDER — MIDAZOLAM HYDROCHLORIDE 1 MG/ML
INJECTION INTRAMUSCULAR; INTRAVENOUS
Status: DISCONTINUED | OUTPATIENT
Start: 2024-10-01 | End: 2024-10-01 | Stop reason: HOSPADM

## 2024-10-01 RX ORDER — SODIUM CHLORIDE 9 MG/ML
INJECTION, SOLUTION INTRAVENOUS ONCE
Status: COMPLETED | OUTPATIENT
Start: 2024-10-01 | End: 2024-10-01

## 2024-10-01 RX ORDER — PREDNISONE 50 MG/1
50 TABLET ORAL
COMMUNITY
Start: 2024-09-20

## 2024-10-01 RX ORDER — VERAPAMIL HYDROCHLORIDE 2.5 MG/ML
INJECTION, SOLUTION INTRAVENOUS
Status: DISCONTINUED | OUTPATIENT
Start: 2024-10-01 | End: 2024-10-01 | Stop reason: HOSPADM

## 2024-10-01 RX ORDER — LIDOCAINE HYDROCHLORIDE 10 MG/ML
INJECTION, SOLUTION INFILTRATION; PERINEURAL
Status: DISCONTINUED | OUTPATIENT
Start: 2024-10-01 | End: 2024-10-01 | Stop reason: HOSPADM

## 2024-10-01 RX ORDER — DIPHENHYDRAMINE HCL 50 MG/1
50 CAPSULE ORAL
COMMUNITY
Start: 2024-09-20

## 2024-10-01 RX ORDER — ROSUVASTATIN CALCIUM 10 MG/1
10 TABLET, COATED ORAL DAILY
COMMUNITY
Start: 2024-09-23

## 2024-10-01 RX ORDER — FENTANYL CITRATE 50 UG/ML
INJECTION, SOLUTION INTRAMUSCULAR; INTRAVENOUS
Status: DISCONTINUED | OUTPATIENT
Start: 2024-10-01 | End: 2024-10-01 | Stop reason: HOSPADM

## 2024-10-01 RX ORDER — DIPHENHYDRAMINE HCL 25 MG
50 CAPSULE ORAL
Status: DISCONTINUED | OUTPATIENT
Start: 2024-10-01 | End: 2024-10-01 | Stop reason: HOSPADM

## 2024-10-01 RX ORDER — HEPARIN SODIUM 1000 [USP'U]/ML
INJECTION, SOLUTION INTRAVENOUS; SUBCUTANEOUS
Status: DISCONTINUED | OUTPATIENT
Start: 2024-10-01 | End: 2024-10-01 | Stop reason: HOSPADM

## 2024-10-01 RX ORDER — DIAZEPAM 5 MG/1
10 TABLET ORAL
Status: DISCONTINUED | OUTPATIENT
Start: 2024-10-01 | End: 2024-10-01 | Stop reason: HOSPADM

## 2024-10-01 RX ORDER — NITROGLYCERIN 20 MG/100ML
INJECTION INTRAVENOUS
Status: DISCONTINUED | OUTPATIENT
Start: 2024-10-01 | End: 2024-10-01 | Stop reason: HOSPADM

## 2024-10-01 RX ORDER — ONDANSETRON HYDROCHLORIDE 2 MG/ML
4 INJECTION, SOLUTION INTRAVENOUS EVERY 8 HOURS PRN
Status: DISCONTINUED | OUTPATIENT
Start: 2024-10-01 | End: 2024-10-01 | Stop reason: HOSPADM

## 2024-10-01 RX ADMIN — SODIUM CHLORIDE: 9 INJECTION, SOLUTION INTRAVENOUS at 10:10

## 2024-10-01 RX ADMIN — DIPHENHYDRAMINE HYDROCHLORIDE 50 MG: 25 CAPSULE ORAL at 06:10

## 2024-10-01 RX ADMIN — SODIUM CHLORIDE: 9 INJECTION, SOLUTION INTRAVENOUS at 06:10

## 2024-10-01 RX ADMIN — DIAZEPAM 10 MG: 5 TABLET ORAL at 06:10

## 2024-10-01 RX ADMIN — FAMOTIDINE 20 MG: 10 INJECTION, SOLUTION INTRAVENOUS at 06:10

## 2024-10-01 NOTE — Clinical Note
Progress Note (short form)





- Note


Progress Note: 





PULMONARY





Appears comfortable. No fevers recorded.





 Vital Signs











 Period  Temp  Pulse  Resp  BP Sys/Curran  Pulse Ox


 


 Last 24 Hr  96.3 F-98.8 F  58-77  19-20  105-124/49-78  97-98











Gen:  breathing nonlabored


Heart: RRR


Lung: decreased breath sounds at the bases


Abd: soft, nontender


Ext: no edema





 CBC, BMP





 12/19/19 06:15 





 12/19/19 06:15 





Active Medications





Acetaminophen (Ofirmev Injection -)  1,000 mg IVPB Q6H PRN


   PRN Reason: PAIN LEVEL 7 - 10


   Last Admin: 12/16/19 13:10 Dose:  1,000 mg


Albuterol Sulfate (Ventolin 0.083% Nebulizer Soln -)  1 amp NEB Q4H PRN


   PRN Reason: SHORT OF BREATH/WHEEZING


Calcium Carbonate/Cholecalciferol (Os-Dale 500+D -)  1 tab GT BID [facility]


   Last Admin: 12/19/19 10:16 Dose:  1 tab


Clobazam (Onfi -)  10 mg GT BID [facility]


   Last Admin: 12/19/19 10:25 Dose:  10 mg


Diazepam (Diastat Rectal Gel -)   mg RC PRN RAKAN


Famotidine (Pepcid)  20 mg PEG DAILY [facility]


   Last Admin: 12/19/19 10:18 Dose:  20 mg


Heparin Sodium (Porcine) (Heparin -)  5,000 unit SQ TID RAKAN


   Last Admin: 12/19/19 06:45 Dose:  5,000 unit


Piperacillin Sod/Tazobactam (Sod 3.375 gm/ Dextrose)  50 mls @ 100 mls/hr IVPB 

Q8H-IV RAKNA; Protocol


   Last Admin: 12/19/19 10:25 Dose:  100 mls/hr


Lactated Ringer's (Lactated Ringers Solution)  1,000 ml in 1,000 mls @ 75 mls/

hr IV ASDIR [facility]


   Last Admin: 12/18/19 22:38 Dose:  75 mls/hr


Lacosamide (Vimpat Liquid -)  200 mg GT BID [facility]


   Last Admin: 12/19/19 10:16 Dose:  200 mg


Lactobacillus Acidophilus (Bacid -)  2 tab GT DAILY [facility]


   Last Admin: 12/19/19 10:15 Dose:  2 tab


Levetiracetam (Keppra Oral Solution -)  2,000 mg GT BID [facility]


   Last Admin: 12/19/19 10:33 Dose:  2,000 mg


Levocarnitine (Carnitor Oral Solution -)  700 mg GT TID [facility]


   Last Admin: 12/19/19 06:45 Dose:  700 mg


Multivitamins/Minerals (Certavite-Antioxidant Liquid)  15 ml PO DAILY [facility]


   Last Admin: 12/19/19 10:33 Dose:  15 ml


Ofloxacin (Ocuflox 0.3% Eye Drops -)  2 drop AD Q4HWA [facility]


   Last Admin: 12/19/19 10:33 Dose:  2 drop


Pyridoxine HCl (Vitamin B6 -)  100 mg GT DAILY [facility]


   Last Admin: 12/19/19 10:16 Dose:  100 mg


Senna/Docusate Sodium (Pericolace -)  2 tablet PO HS [facility]


   Last Admin: 12/18/19 22:30 Dose:  2 tablet


Sodium Chloride (Sodium Chloride Tablet -)  3.5 gm GT BID [facility]


   Last Admin: 12/19/19 10:17 Dose:  3.5 gm


Zonisamide (Zonisamide)  300 mg GT BID [facility]


   Last Admin: 12/19/19 10:19 Dose:  300 mg








A/P


Acute Hypoxic Respiratory Failure


Pneumonia


Lung Nodule


Cerebral Palsy


Mental Retardation


Seizure Disorder





-  awaiting suprapubic catheter exchange


-  continue antibiotics per ID


-  O2 to keep SpO2 >90%


-  aspiration precautions


-  inhaled bronchodilators as needed


-  DVT prophylaxis


-  conservative management of lung nodule The physician was paged.

## 2024-10-01 NOTE — Clinical Note
The catheter was inserted into the and was inserted over the wire into the left ventricle. Hemodynamics were performed.  and Pullback was recorded.  EDP 18

## 2024-10-01 NOTE — DISCHARGE INSTRUCTIONS
---  **Post-Procedure Care Instructions:**    1. **Dressing and Armboard Removal:**       - Remove the dressing and armboard 24 hours after the procedure.    2. **Showering:**       - You may shower 24 hours after the procedure. Use only soap and water to clean the area.     3. **Driving:**       - Do not drive for 2 days following the procedure.    4. **Lifting Restrictions:**       - Avoid lifting anything heavier than a gallon of milk for 5 days.    5. **Submersion:**       - Do not submerge the site under water (e.g., no baths or swimming) for 5 days.    6. **Lotions and Creams:**       - Avoid using lotions, powders, or creams around the site for 5 days.    7. **Signs of Infection or Complications:**       - Go to the nearest emergency room if you experience any of the following:       - Fever       - Discharge from the site       - Redness or swelling around the site    8. **If Bleeding Occurs:**       - If the site starts to bleed, lay flat on the ground, apply firm pressure to the site, and call 911 immediately.    ---   Resume Metformin in 2 days

## 2024-10-01 NOTE — Clinical Note
The catheter was inserted into the and was removed from the ostium   left main. Hemodynamics were performed.  An angiography was performed of the left coronary arteries. The angiography was performed via power injection.

## 2024-10-01 NOTE — Clinical Note
The catheter was inserted into the and was inserted over the wire into the ostium   first OM artery. Hemodynamics were performed.  An angiography was performed of the left coronary arteries. The angiography was performed via power injection.

## 2024-10-01 NOTE — Clinical Note
The DP pulses were 2+ bilaterally. The left radial pulse was 1+. The right radial pulse was +2 and allens test negative.

## 2024-10-04 VITALS
TEMPERATURE: 98 F | WEIGHT: 213.19 LBS | HEART RATE: 56 BPM | DIASTOLIC BLOOD PRESSURE: 71 MMHG | OXYGEN SATURATION: 96 % | HEIGHT: 61 IN | BODY MASS INDEX: 40.25 KG/M2 | SYSTOLIC BLOOD PRESSURE: 158 MMHG | RESPIRATION RATE: 12 BRPM

## 2024-10-15 ENCOUNTER — DOCUMENTATION ONLY (OUTPATIENT)
Dept: FAMILY MEDICINE | Facility: CLINIC | Age: 74
End: 2024-10-15
Payer: MEDICARE

## 2024-10-15 LAB
LEFT EYE DM RETINOPATHY: NEGATIVE
RIGHT EYE DM RETINOPATHY: NORMAL

## 2024-11-18 ENCOUNTER — TELEPHONE (OUTPATIENT)
Dept: FAMILY MEDICINE | Facility: CLINIC | Age: 74
End: 2024-11-18
Payer: MEDICARE

## 2024-11-18 DIAGNOSIS — F17.210 CIGARETTE NICOTINE DEPENDENCE WITHOUT COMPLICATION: Primary | ICD-10-CM

## 2024-11-18 NOTE — TELEPHONE ENCOUNTER
----- Message from Vokle sent at 11/15/2024 12:07 PM CST -----  .Who Called: Dodie Brody    What order is the patient requesting: :Lunch cancer Screening    When does the expect the orders to be performed? Once insurance will cover it         Preferred Method of Contact: Phone Call  Patient's Preferred Phone Number on File: [phone]   Best Call Back Number, if different:  Additional Information: pt is requesting that it can be done for 1 year and a day to when she had it done last and she isn't sure when that was. Please call to advise once completed

## 2024-12-05 ENCOUNTER — HOSPITAL ENCOUNTER (OUTPATIENT)
Dept: RADIOLOGY | Facility: HOSPITAL | Age: 74
Discharge: HOME | End: 2024-12-05
Attending: NURSE PRACTITIONER
Payer: MEDICARE

## 2024-12-05 DIAGNOSIS — Z87.891 PERSONAL HISTORY OF SMOKING: ICD-10-CM

## 2024-12-05 PROCEDURE — 71271 CT THORAX LUNG CANCER SCR C-: CPT | Mod: TC

## 2025-01-28 ENCOUNTER — LAB VISIT (OUTPATIENT)
Dept: LAB | Facility: HOSPITAL | Age: 75
End: 2025-01-28
Attending: FAMILY MEDICINE
Payer: MEDICARE

## 2025-01-28 DIAGNOSIS — E11.59 HYPERTENSION ASSOCIATED WITH DIABETES: ICD-10-CM

## 2025-01-28 DIAGNOSIS — E11.69 HYPERLIPIDEMIA ASSOCIATED WITH TYPE 2 DIABETES MELLITUS: ICD-10-CM

## 2025-01-28 DIAGNOSIS — E11.65 TYPE 2 DIABETES MELLITUS WITH HYPERGLYCEMIA, WITHOUT LONG-TERM CURRENT USE OF INSULIN: ICD-10-CM

## 2025-01-28 DIAGNOSIS — I15.2 HYPERTENSION ASSOCIATED WITH DIABETES: ICD-10-CM

## 2025-01-28 DIAGNOSIS — R53.83 FATIGUE, UNSPECIFIED TYPE: ICD-10-CM

## 2025-01-28 DIAGNOSIS — E78.5 HYPERLIPIDEMIA ASSOCIATED WITH TYPE 2 DIABETES MELLITUS: ICD-10-CM

## 2025-01-28 DIAGNOSIS — Z00.00 WELLNESS EXAMINATION: ICD-10-CM

## 2025-01-28 LAB
ALBUMIN SERPL-MCNC: 3.9 G/DL (ref 3.4–4.8)
ALBUMIN/GLOB SERPL: 1.2 RATIO (ref 1.1–2)
ALP SERPL-CCNC: 60 UNIT/L (ref 40–150)
ALT SERPL-CCNC: 16 UNIT/L (ref 0–55)
ANION GAP SERPL CALC-SCNC: 7 MEQ/L
AST SERPL-CCNC: 16 UNIT/L (ref 5–34)
BASOPHILS # BLD AUTO: 0.03 X10(3)/MCL
BASOPHILS NFR BLD AUTO: 0.4 %
BILIRUB SERPL-MCNC: 0.4 MG/DL
BUN SERPL-MCNC: 14.2 MG/DL (ref 9.8–20.1)
CALCIUM SERPL-MCNC: 9.7 MG/DL (ref 8.4–10.2)
CHLORIDE SERPL-SCNC: 108 MMOL/L (ref 98–107)
CHOLEST SERPL-MCNC: 124 MG/DL
CHOLEST/HDLC SERPL: 3 {RATIO} (ref 0–5)
CO2 SERPL-SCNC: 26 MMOL/L (ref 23–31)
CREAT SERPL-MCNC: 0.78 MG/DL (ref 0.55–1.02)
CREAT UR-MCNC: 39.6 MG/DL (ref 45–106)
CREAT/UREA NIT SERPL: 18
EOSINOPHIL # BLD AUTO: 0.11 X10(3)/MCL (ref 0–0.9)
EOSINOPHIL NFR BLD AUTO: 1.6 %
ERYTHROCYTE [DISTWIDTH] IN BLOOD BY AUTOMATED COUNT: 14.1 % (ref 11.5–17)
EST. AVERAGE GLUCOSE BLD GHB EST-MCNC: 137 MG/DL
GFR SERPLBLD CREATININE-BSD FMLA CKD-EPI: >60 ML/MIN/1.73/M2
GLOBULIN SER-MCNC: 3.3 GM/DL (ref 2.4–3.5)
GLUCOSE SERPL-MCNC: 136 MG/DL (ref 82–115)
HBA1C MFR BLD: 6.4 %
HCT VFR BLD AUTO: 40.5 % (ref 37–47)
HDLC SERPL-MCNC: 44 MG/DL (ref 35–60)
HGB BLD-MCNC: 13.1 G/DL (ref 12–16)
IMM GRANULOCYTES # BLD AUTO: 0.02 X10(3)/MCL (ref 0–0.04)
IMM GRANULOCYTES NFR BLD AUTO: 0.3 %
LDLC SERPL CALC-MCNC: 59 MG/DL (ref 50–140)
LYMPHOCYTES # BLD AUTO: 1.82 X10(3)/MCL (ref 0.6–4.6)
LYMPHOCYTES NFR BLD AUTO: 26.6 %
MCH RBC QN AUTO: 28.5 PG (ref 27–31)
MCHC RBC AUTO-ENTMCNC: 32.3 G/DL (ref 33–36)
MCV RBC AUTO: 88.2 FL (ref 80–94)
MICROALBUMIN UR-MCNC: <5 UG/ML
MICROALBUMIN/CREAT RATIO PNL UR: ABNORMAL
MONOCYTES # BLD AUTO: 0.5 X10(3)/MCL (ref 0.1–1.3)
MONOCYTES NFR BLD AUTO: 7.3 %
NEUTROPHILS # BLD AUTO: 4.36 X10(3)/MCL (ref 2.1–9.2)
NEUTROPHILS NFR BLD AUTO: 63.8 %
NRBC BLD AUTO-RTO: 0 %
PLATELET # BLD AUTO: 204 X10(3)/MCL (ref 130–400)
PMV BLD AUTO: 10.4 FL (ref 7.4–10.4)
POTASSIUM SERPL-SCNC: 4.9 MMOL/L (ref 3.5–5.1)
PROT SERPL-MCNC: 7.2 GM/DL (ref 5.8–7.6)
RBC # BLD AUTO: 4.59 X10(6)/MCL (ref 4.2–5.4)
SODIUM SERPL-SCNC: 141 MMOL/L (ref 136–145)
TRIGL SERPL-MCNC: 103 MG/DL (ref 37–140)
TSH SERPL-ACNC: 2.13 UIU/ML (ref 0.35–4.94)
VLDLC SERPL CALC-MCNC: 21 MG/DL
WBC # BLD AUTO: 6.84 X10(3)/MCL (ref 4.5–11.5)

## 2025-01-28 PROCEDURE — 85025 COMPLETE CBC W/AUTO DIFF WBC: CPT

## 2025-01-28 PROCEDURE — 80053 COMPREHEN METABOLIC PANEL: CPT

## 2025-01-28 PROCEDURE — 36415 COLL VENOUS BLD VENIPUNCTURE: CPT

## 2025-01-28 PROCEDURE — 84443 ASSAY THYROID STIM HORMONE: CPT

## 2025-01-28 PROCEDURE — 83036 HEMOGLOBIN GLYCOSYLATED A1C: CPT

## 2025-01-28 PROCEDURE — 80061 LIPID PANEL: CPT

## 2025-01-28 PROCEDURE — 82570 ASSAY OF URINE CREATININE: CPT

## 2025-02-03 ENCOUNTER — HOSPITAL ENCOUNTER (OUTPATIENT)
Dept: RADIOLOGY | Facility: HOSPITAL | Age: 75
Discharge: HOME | End: 2025-02-03
Payer: MEDICARE

## 2025-02-03 DIAGNOSIS — Z08 ENCOUNTER FOR FOLLOW-UP SURVEILLANCE OF BREAST CANCER: ICD-10-CM

## 2025-02-03 DIAGNOSIS — Z85.3 PERSONAL HISTORY OF BREAST CANCER: ICD-10-CM

## 2025-02-03 DIAGNOSIS — Z12.31 SCREENING MAMMOGRAM FOR BREAST CANCER: ICD-10-CM

## 2025-02-03 DIAGNOSIS — Z85.3 ENCOUNTER FOR FOLLOW-UP SURVEILLANCE OF BREAST CANCER: ICD-10-CM

## 2025-02-03 PROCEDURE — 77063 BREAST TOMOSYNTHESIS BI: CPT | Mod: TC,52

## 2025-02-03 PROCEDURE — 77067 SCR MAMMO BI INCL CAD: CPT | Mod: 26,52,, | Performed by: RADIOLOGY

## 2025-02-03 PROCEDURE — 77063 BREAST TOMOSYNTHESIS BI: CPT | Mod: 26,52,, | Performed by: RADIOLOGY

## 2025-02-04 ENCOUNTER — OFFICE VISIT (OUTPATIENT)
Dept: FAMILY MEDICINE | Facility: CLINIC | Age: 75
End: 2025-02-04
Payer: MEDICARE

## 2025-02-04 VITALS
OXYGEN SATURATION: 98 % | WEIGHT: 206 LBS | DIASTOLIC BLOOD PRESSURE: 82 MMHG | HEIGHT: 61 IN | SYSTOLIC BLOOD PRESSURE: 132 MMHG | RESPIRATION RATE: 16 BRPM | BODY MASS INDEX: 38.89 KG/M2 | TEMPERATURE: 98 F | HEART RATE: 70 BPM

## 2025-02-04 DIAGNOSIS — I15.2 HYPERTENSION ASSOCIATED WITH DIABETES: ICD-10-CM

## 2025-02-04 DIAGNOSIS — Z85.3 HISTORY OF BREAST CANCER: ICD-10-CM

## 2025-02-04 DIAGNOSIS — E11.69 HYPERLIPIDEMIA ASSOCIATED WITH TYPE 2 DIABETES MELLITUS: ICD-10-CM

## 2025-02-04 DIAGNOSIS — Z71.89 ADVANCED CARE PLANNING/COUNSELING DISCUSSION: ICD-10-CM

## 2025-02-04 DIAGNOSIS — Z12.31 BREAST CANCER SCREENING BY MAMMOGRAM: ICD-10-CM

## 2025-02-04 DIAGNOSIS — E11.65 TYPE 2 DIABETES MELLITUS WITH HYPERGLYCEMIA, WITHOUT LONG-TERM CURRENT USE OF INSULIN: ICD-10-CM

## 2025-02-04 DIAGNOSIS — Z87.891 EX-CIGARETTE SMOKER: ICD-10-CM

## 2025-02-04 DIAGNOSIS — I70.0 AORTIC ATHEROSCLEROSIS: ICD-10-CM

## 2025-02-04 DIAGNOSIS — Z78.0 POSTMENOPAUSAL: ICD-10-CM

## 2025-02-04 DIAGNOSIS — Z00.00 MEDICARE ANNUAL WELLNESS VISIT, SUBSEQUENT: Primary | ICD-10-CM

## 2025-02-04 DIAGNOSIS — M85.80 OSTEOPENIA, UNSPECIFIED LOCATION: ICD-10-CM

## 2025-02-04 DIAGNOSIS — E78.5 HYPERLIPIDEMIA ASSOCIATED WITH TYPE 2 DIABETES MELLITUS: ICD-10-CM

## 2025-02-04 DIAGNOSIS — E11.59 HYPERTENSION ASSOCIATED WITH DIABETES: ICD-10-CM

## 2025-02-04 DIAGNOSIS — E55.9 VITAMIN D DEFICIENCY: ICD-10-CM

## 2025-02-04 DIAGNOSIS — Z12.11 COLON CANCER SCREENING: ICD-10-CM

## 2025-02-04 DIAGNOSIS — E66.01 CLASS 2 SEVERE OBESITY DUE TO EXCESS CALORIES WITH SERIOUS COMORBIDITY AND BODY MASS INDEX (BMI) OF 38.0 TO 38.9 IN ADULT: ICD-10-CM

## 2025-02-04 DIAGNOSIS — E66.812 CLASS 2 SEVERE OBESITY DUE TO EXCESS CALORIES WITH SERIOUS COMORBIDITY AND BODY MASS INDEX (BMI) OF 38.0 TO 38.9 IN ADULT: ICD-10-CM

## 2025-02-04 PROBLEM — E66.09 OBESITY DUE TO EXCESS CALORIES WITH SERIOUS COMORBIDITY: Status: ACTIVE | Noted: 2022-07-25

## 2025-02-04 PROBLEM — C50.919 MALIGNANT NEOPLASM OF BREAST: Status: RESOLVED | Noted: 2022-07-25 | Resolved: 2025-02-04

## 2025-02-04 PROCEDURE — G0439 PPPS, SUBSEQ VISIT: HCPCS | Mod: ,,, | Performed by: FAMILY MEDICINE

## 2025-02-04 RX ORDER — FAMOTIDINE 40 MG/1
40 TABLET, FILM COATED ORAL 2 TIMES DAILY
COMMUNITY
Start: 2024-09-20 | End: 2025-02-04

## 2025-02-04 RX ORDER — ROSUVASTATIN CALCIUM 20 MG/1
20 TABLET, COATED ORAL
COMMUNITY
Start: 2025-01-07

## 2025-02-04 RX ORDER — ASPIRIN 81 MG/1
81 TABLET ORAL DAILY
COMMUNITY

## 2025-02-04 RX ORDER — METOPROLOL SUCCINATE 25 MG/1
25 TABLET, EXTENDED RELEASE ORAL
COMMUNITY
Start: 2025-01-06

## 2025-02-04 RX ORDER — AMLODIPINE BESYLATE 2.5 MG/1
2.5 TABLET ORAL
COMMUNITY
Start: 2025-01-06

## 2025-02-04 NOTE — ASSESSMENT & PLAN NOTE
"On statin. Keep appts with cards    Lab Results   Component Value Date    CHOL 124 01/28/2025    CHOL 169 01/23/2024    CHOL 159 01/19/2023     Lab Results   Component Value Date    HDL 44 01/28/2025    HDL 53 01/23/2024    HDL 47 01/19/2023     No results found for: "LDLCALC"  Lab Results   Component Value Date    TRIG 103 01/28/2025    TRIG 133 01/23/2024    TRIG 109 01/19/2023       No results found for: "CHOLHDL"    "

## 2025-02-04 NOTE — ASSESSMENT & PLAN NOTE
Had ct lung cancer screening done 8/2018, 8/2019, 8/2020, 9/2021, 7/2022, 9/2023, 12/2024 all stable.

## 2025-02-04 NOTE — PROGRESS NOTES
Internal Medicine      Patient ID: [id number]     Chief Complaint: Medicare Annual Wellness     HPI:     Dodie Brody is a 74 y.o. female here today for a Medicare Annual Wellness visit and comprehensive Health Risk Assessment.       presents to the clinic unaccompanied for her wellness visit.  She did labs prior to her appt       Did golo.  Doing mediterranean diet x 1.5 months. She has diabetes. Currently she is on metformin 850mg PO qAm.  She is trying her best to follow a diabetic diet.  Her last foot exam today. Her eye exams are with by Dr. Huertas 10/2024. her last A1c 6.5 1/2024.  urine micro 1/2024.       The patient was diagnosed with breast cancer per her screening mammogram in 3/2019. This was abnormal and resulted in a biopsy, subsequent lumpectomy of her left breast and then left mastectomy on May 6, 2019 with Dr. Rios. She reports she will not need chemotherapy or radiation. last visit 8/2020. last mammogram 2/3/25. Report not available yet.  Does at [facility].      She has hypertension. The patient is on enalapril 20mg in am and norvasc 2.5mg. she has a bp machine at home. She is taking her medications as prescribed. she fills [facility] at [facility]. She is on  asa. She also has hyperlipidemia and is on crestor 20mg. Is is on omega3.  She is now seeing cards for blockage in her legs.  On plavix. Has appt with dr. Beebe 2/2025     She has low vitamin D3 1000IU and supplements over-the-counter. Was wnl 1/2022.  She does not want to check this level because medicare does not cover the lab even on a wellness.      She is ALLERGIC to iodine. completed covid series and booster.      She is a former smoker. quit >17 years ago. Had ct lung cancer screening done 8/2018, 8/2019, 8/2020, 9/2021, 7/2022, 9/2023, 12/2024 all stable.      She declines shingles and tetanus and pneumonia vaccination. She had cologuard 3/2020 and 7/2023.   She has had a hysterectomy in her late 20s for noncancerous reasons and therefore  no longer does pap smears. dexa 1/2020 showed osteopenia- taking her vitamin d and has not been taking calcium. repeat dexa done 1/2022.     Had cataract surgery with dr. Cooper.  Doing well.       she is mobility impaired and has tag. cannot walk more than 200ft without stopping to rest.  Asking for renewal for 11/15/24.         Health Maintenance         Date Due Completion Date    COVID-19 Vaccine (5 - 2024-25 season) 09/01/2024 5/9/2022    Mammogram 01/31/2025 1/31/2024    RSV Vaccine (Age 60+ and Pregnant patients) (1 - Risk 60-74 years 1-dose series) 02/04/2025 (Originally 11/3/2010) ---    DEXA Scan 02/04/2025 (Originally 1/27/2025) 1/27/2022    TETANUS VACCINE 06/03/2025 (Originally 11/3/1968) ---    Shingles Vaccine (1 of 2) 06/03/2025 (Originally 11/3/1969) ---    Foot Exam 06/03/2025 6/3/2024    Hemoglobin A1c 07/28/2025 1/28/2025    Diabetic Eye Exam 10/15/2025 10/15/2024    LDCT Lung Screen 12/05/2025 12/5/2024    Diabetes Urine Screening 01/28/2026 1/28/2025    Lipid Panel 01/28/2026 1/28/2025    High Dose Statin 02/04/2026 2/4/2025    Colorectal Cancer Screening 07/30/2026 7/30/2023             Past Medical History:   Diagnosis Date    Arthritis     Diabetes mellitus     Lung mass         Past Surgical History:   Procedure Laterality Date    APPENDECTOMY      BREAST BIOPSY      BREAST SURGERY  2020    MASTECTOMY    CHOLECYSTECTOMY      CORONARY ANGIOGRAPHY N/A 10/1/2024    Procedure: ANGIOGRAM, CORONARY ARTERY;  Surgeon: Rell Beebe MD;  Location: [facility] CATH LAB;  Service: Cardiology;  Laterality: N/A;  CORONARY ANGIO VIA RT RADIAL, MAY NEED SHOCKWAVE    EYE SURGERY      Cataract    HYSTERECTOMY      left mastectomy      NECK SURGERY      PHACOEMULSIFICATION, CATARACT, WITH IOL INSERTION Right 08/22/2023    Procedure: PHACOEMULSIFICATION, CATARACT, WITH IOL INSERTION- OD;  Surgeon: Ronal Cooper MD;  Location[facility] OR;  Service: Ophthalmology;  Laterality: Right;    PHACOEMULSIFICATION, CATARACT,  WITH IOL INSERTION Left 2023    Procedure: PHACOEMULSIFICATION, CATARACT, WITH IOL INSERTION- OS;  Surgeon: Ronal Cooper MD;  Location: [facility];  Service: Ophthalmology;  Laterality: Left;    tailbone cyst removal          Social History     Socioeconomic History    Marital status:    Tobacco Use    Smoking status: Former     Current packs/day: 0.00     Average packs/day: 1.5 packs/day for 59.0 years (88.5 ttl pk-yrs)     Types: Cigarettes     Quit date: 2022     Years since quittin.2    Smokeless tobacco: Never    Tobacco comments:     Quit 20 years ago   Substance and Sexual Activity    Alcohol use: Yes     Comment: Once a year- on occasions    Drug use: Never    Sexual activity: Yes     Partners: Male     Birth control/protection: None     Social Drivers of Health     Financial Resource Strain: Low Risk  (2024)    Overall Financial Resource Strain (CARDIA)     Difficulty of Paying Living Expenses: Not very hard   Food Insecurity: No Food Insecurity (2024)    Hunger Vital Sign     Worried About Running Out of Food in the Last Year: Never true     Ran Out of Food in the Last Year: Never true   Physical Activity: Insufficiently Active (2024)    Exercise Vital Sign     Days of Exercise per Week: 2 days     Minutes of Exercise per Session: 20 min   Stress: No Stress Concern Present (2024)    [employer] - Occupational Stress Questionnaire     Feeling of Stress : Not at all   Housing Stability: Unknown (2024)    Housing Stability Vital Sign     Unable to Pay for Housing in the Last Year: No        Family History   Problem Relation Name Age of Onset    Other Mother Emily Hernandez         heart issues    Heart disease Mother Emily Hernandez     Hypertension Mother Emily Hernandez     Lung disease Father          Black lung    Breast cancer Paternal Aunt  72    Arthritis Maternal Grandmother Lakisha Monkleau     Cancer Maternal Aunt Emily barillaseau     Diabetes  Maternal Aunt Jhonatan         Current Outpatient Medications   Medication Instructions    amLODIPine (NORVASC) 2.5 mg    aspirin (ECOTRIN) 81 mg, Oral, Daily    calcium-vitamin D3 (OS-MICHELLE 500 + D3) 500 mg-5 mcg (200 unit) per tablet 1 tablet    clopidogreL (PLAVIX) 75 mg, Daily    enalapril (VASOTEC) 20 mg, Oral, Daily    metFORMIN (GLUCOPHAGE) 850 MG tablet TAKE ONE TABLET BY MOUTH EVERY MORNING WITH BREAKFAST    metoprolol succinate (TOPROL-XL) 25 mg    rosuvastatin (CRESTOR) 20 mg       Review of patient's allergies indicates:   Allergen Reactions    Iodine Shortness Of Breath     Injection  Can eat seafood        Immunization History   Administered Date(s) Administered    COVID-19, MRNA, LN-S, PF (Pfizer) (Gray Cap) 05/09/2022    COVID-19, MRNA, LN-S, PF (Pfizer) (Purple Cap) 03/10/2021, 03/31/2021, 10/01/2021    Influenza (FLUBLOK) - Quadrivalent - Recombinant - PF *Preferred* (egg allergy) 09/27/2017, 11/01/2021    Influenza - Quadrivalent 10/07/2020    Influenza - Quadrivalent - High Dose - PF (65 years and older) 10/05/2022, 10/10/2023    Influenza - Trivalent - Afluria, Fluzone MDV 09/22/2012    Influenza - Trivalent - Fluarix, Flulaval, Fluzone, Afluria - PF 09/22/2012, 10/20/2014, 09/27/2017, 10/05/2018, 10/22/2019, 10/07/2020    Influenza - Trivalent - Fluzone High Dose - PF (65 years and older) 11/12/2015, 09/26/2016, 10/05/2018, 10/22/2019, 10/21/2024    Influenza Split 09/22/2012    Pneumococcal Conjugate - 13 Valent 01/14/2019    Pneumococcal Conjugate - 20 Valent 02/01/2024        Patient Care Team:  Cherri Watson MD as PCP - General (Family Medicine)  Cherri Watson MD Grisby, Shaunda K, MD as Consulting Physician (Breast Surgery)  Ronal Cooper MD as Consulting Physician (Ophthalmology)  Rell Beebe MD as Consulting Physician (Cardiology)    Subjective:     Review of Systems   Constitutional: Negative.    HENT: Negative.     Respiratory: Negative.     Cardiovascular: Negative.   "  Gastrointestinal: Negative.    Genitourinary: Negative.        12 point review of systems conducted, negative except as stated in the history of present illness. See HPI for details.    Objective:     Visit Vitals  /82 (BP Location: Left arm, Patient Position: Sitting)   Pulse 70   Temp 98 °F (36.7 °C) (Temporal)   Resp 16   Ht 5' 1" (1.549 m)   Wt 93.4 kg (206 lb)   LMP  (LMP Unknown)   SpO2 98%   BMI 38.92 kg/m²       Physical Exam  Vitals and nursing note reviewed.   Constitutional:       Appearance: Normal appearance. She is obese.   HENT:      Head: Normocephalic and atraumatic.      Nose: Nose normal.      Mouth/Throat:      Mouth: Mucous membranes are moist.      Pharynx: Oropharynx is clear.   Eyes:      Extraocular Movements: Extraocular movements intact.   Cardiovascular:      Rate and Rhythm: Normal rate and regular rhythm.      Pulses: Normal pulses.      Heart sounds: Normal heart sounds.   Pulmonary:      Effort: Pulmonary effort is normal.      Breath sounds: Normal breath sounds.   Musculoskeletal:         General: Normal range of motion.      Cervical back: Normal range of motion.   Skin:     General: Skin is warm and dry.   Neurological:      General: No focal deficit present.      Mental Status: She is alert and oriented to person, place, and time. Mental status is at baseline.   Psychiatric:         Mood and Affect: Mood normal.         Assessment:       ICD-10-CM ICD-9-CM   1. Medicare annual wellness visit, subsequent  Z00.00 V70.0   2. Advanced care planning/counseling discussion  Z71.89 V65.49   3. Type 2 diabetes mellitus with hyperglycemia, without long-term current use of insulin  E11.65 250.00     790.29   4. Hypertension associated with diabetes  E11.59 250.80    I15.2 401.9   5. Hyperlipidemia associated with type 2 diabetes mellitus  E11.69 250.80    E78.5 272.4   6. Aortic atherosclerosis  I70.0 440.0   7. History of breast cancer  Z85.3 V10.3   8. Vitamin D deficiency  E55.9 " 268.9   9. Class 2 severe obesity due to excess calories with serious comorbidity and body mass index (BMI) of 38.0 to 38.9 in adult  E66.812 278.01    E66.01 V85.38    Z68.38    10. Colon cancer screening  Z12.11 V76.51   11. Osteopenia, unspecified location  M85.80 733.90   12. Postmenopausal  Z78.0 V49.81   13. Breast cancer screening by mammogram  Z12.31 V76.12   14. Ex-cigarette smoker  Z87.891 V15.82        Plan:     1. Medicare annual wellness visit, subsequent  Assessment & Plan:  Previously done labs reviewed with patient at time of appointment today  Mammogram 2/2025  DEXA  1/2022- declines further  No  Longer does pap due to hyst  Cologuard 3/2020 and 7/2023    Advanced care planning discussed and paperwork given        2. Advanced care planning/counseling discussion  Assessment & Plan:  Advanced care planning discussed and paperwork given.    I attest that I have had a face to face discussion with patient and or surrogate decision maker.   Included surrogate decision maker: NO  Advanced directive in chart: NO  LAPOST: NO    Total time spent: 16 minutes        3. Type 2 diabetes mellitus with hyperglycemia, without long-term current use of insulin  Assessment & Plan:  Lab Results   Component Value Date    HGBA1C 6.4 01/28/2025     Urine micro 1/2025  Foot exam 6/2024  Eye exam with dr. Huertas. 10/2024    On metformin 850 mg qday.  On acei and statin.      Continue to follow dmii diet. Continue to monitor cbgs.   Contact clinic for concerns.     Orders:  -     Diabetes Digital Medicine (DDMP) Enrollment Order  -     Comprehensive Metabolic Panel; Future; Expected date: 08/04/2025  -     Hemoglobin A1C; Future; Expected date: 08/04/2025    4. Hypertension associated with diabetes  Assessment & Plan:  Well controlled on current prescriptions. On asa. Keep appts with cards    Orders:  -     Hypertension Digital Medicine (HDMP) Enrollment Order  -     Comprehensive Metabolic Panel; Future; Expected date:  "08/04/2025  -     Hemoglobin A1C; Future; Expected date: 08/04/2025    5. Hyperlipidemia associated with type 2 diabetes mellitus  Assessment & Plan:  On statin. Keep appts with cards    Lab Results   Component Value Date    CHOL 124 01/28/2025    CHOL 169 01/23/2024    CHOL 159 01/19/2023     Lab Results   Component Value Date    HDL 44 01/28/2025    HDL 53 01/23/2024    HDL 47 01/19/2023     No results found for: "LDLCALC"  Lab Results   Component Value Date    TRIG 103 01/28/2025    TRIG 133 01/23/2024    TRIG 109 01/19/2023       No results found for: "CHOLHDL"      Orders:  -     Comprehensive Metabolic Panel; Future; Expected date: 08/04/2025  -     Hemoglobin A1C; Future; Expected date: 08/04/2025    6. Aortic atherosclerosis  Assessment & Plan:  On statin.  Keep appts with cards      7. History of breast cancer  Assessment & Plan:  Dx 3/2019.  History of left lumpectomy and then mastectomy 5/2019.  No chemo or radiation.  keep appts with dr. Rios,   Continue annual screening. Mm 2/2025- waiting on results      8. Vitamin D deficiency  Assessment & Plan:  Continue to supplement otc      9. Class 2 severe obesity due to excess calories with serious comorbidity and body mass index (BMI) of 38.0 to 38.9 in adult  Assessment & Plan:  Encouraged lifestyle change      10. Colon cancer screening  Assessment & Plan:  cologuard 3/2020 and 7/2023.       11. Osteopenia, unspecified location  Assessment & Plan:  See postmenopausal A&P      12. Postmenopausal  Assessment & Plan:  Declines dexa      13. Breast cancer screening by mammogram  Assessment & Plan:  See history of breast cancer A&P      14. Ex-cigarette smoker  Assessment & Plan:  Had ct lung cancer screening done 8/2018, 8/2019, 8/2020, 9/2021, 7/2022, 9/2023, 12/2024 all stable.           A comprehensive HEALTH RISK ASSESSMENT was completed today. Results are summarized below:    There are NO EMOTIONAL/SOCIAL CONCERNS identified on today's screening for " Social Isolation, Depression and Anxiety.    There are NO COGNITIVE FUNCTION CONCERNS identified on today's screening.  There are NO FUNCTIONAL OR SAFETY CONCERNS were identified on today's screening for Physical Symptoms, Nutritional, Cognitive Function, Home Safety/Living Situation, Fall Risk, Activities of Daily Living, Independent Activities of Daily Living, Physical Activity, Timed Up and Go test and Whisper test.   The patient reports NO OPIOID PRESCRIPTIONS. This was confirmed through medication reconciliation and the [facility] website.    The patient is NOT A TOBACCO USER.        All Questions regarding food, transportation or housing were not answered today.    The patient was asked and declined the use of a free .    Advance Care Planning   Today we discussed advance care planning. She is interested in learning more about how to make Advance Directives. Information was provided and I offered to discuss more at her discretion.         Provided patient with a 5-10 year written screening schedule and personal prevention plan. Recommendations were developed using the USPSTF age appropriate recommendations. Education, counseling, and referrals were provided as needed. After Visit Summary printed and given to patient, which includes a list of additional screenings\tests needed.    Follow up in about 6 months (around 8/4/2025) for diabetes with labs. In addition to their scheduled follow up, the patient has also been instructed to follow up on as needed basis.     Future Appointments   Date Time Provider Department Center   8/5/2025  8:30 AM Cherri Watson MD [facility] MISTY Watson MD

## 2025-02-04 NOTE — ASSESSMENT & PLAN NOTE
Dx 3/2019.  History of left lumpectomy and then mastectomy 5/2019.  No chemo or radiation.  keep appts with dr. Rios,   Continue annual screening. Mmg 2/2025- waiting on results

## 2025-02-04 NOTE — ASSESSMENT & PLAN NOTE
Previously done labs reviewed with patient at time of appointment today  Mammogram 2/2025  DEXA  1/2022- declines further  No  Longer does pap due to hyst  Cologuard 3/2020 and 7/2023    Advanced care planning discussed and paperwork given

## 2025-02-04 NOTE — ASSESSMENT & PLAN NOTE
Lab Results   Component Value Date    HGBA1C 6.4 01/28/2025     Urine micro 1/2025  Foot exam 6/2024  Eye exam with dr. Huertas. 10/2024    On metformin 850 mg qday.  On acei and statin.      Continue to follow dmii diet. Continue to monitor cbgs.   Contact clinic for concerns.

## 2025-02-27 DIAGNOSIS — E11.59 HYPERTENSION ASSOCIATED WITH DIABETES: ICD-10-CM

## 2025-02-27 DIAGNOSIS — I15.2 HYPERTENSION ASSOCIATED WITH DIABETES: ICD-10-CM

## 2025-02-27 RX ORDER — ENALAPRIL MALEATE 20 MG/1
20 TABLET ORAL DAILY
Qty: 90 TABLET | Refills: 3 | Status: SHIPPED | OUTPATIENT
Start: 2025-02-27 | End: 2026-02-27

## 2025-07-28 ENCOUNTER — LAB VISIT (OUTPATIENT)
Dept: LAB | Facility: HOSPITAL | Age: 75
End: 2025-07-28
Attending: FAMILY MEDICINE
Payer: MEDICARE

## 2025-07-28 DIAGNOSIS — E11.59 HYPERTENSION ASSOCIATED WITH DIABETES: ICD-10-CM

## 2025-07-28 DIAGNOSIS — E11.65 TYPE 2 DIABETES MELLITUS WITH HYPERGLYCEMIA, WITHOUT LONG-TERM CURRENT USE OF INSULIN: ICD-10-CM

## 2025-07-28 DIAGNOSIS — E78.5 HYPERLIPIDEMIA ASSOCIATED WITH TYPE 2 DIABETES MELLITUS: ICD-10-CM

## 2025-07-28 DIAGNOSIS — E11.69 HYPERLIPIDEMIA ASSOCIATED WITH TYPE 2 DIABETES MELLITUS: ICD-10-CM

## 2025-07-28 DIAGNOSIS — I15.2 HYPERTENSION ASSOCIATED WITH DIABETES: ICD-10-CM

## 2025-07-28 LAB
ALBUMIN SERPL-MCNC: 3.9 G/DL (ref 3.4–4.8)
ALBUMIN/GLOB SERPL: 1 RATIO (ref 1.1–2)
ALP SERPL-CCNC: 54 UNIT/L (ref 40–150)
ALT SERPL-CCNC: 15 UNIT/L (ref 0–55)
ANION GAP SERPL CALC-SCNC: 7 MEQ/L
AST SERPL-CCNC: 15 UNIT/L (ref 11–45)
BILIRUB SERPL-MCNC: 0.6 MG/DL
BUN SERPL-MCNC: 14.2 MG/DL (ref 9.8–20.1)
CALCIUM SERPL-MCNC: 9.5 MG/DL (ref 8.4–10.2)
CHLORIDE SERPL-SCNC: 108 MMOL/L (ref 98–107)
CO2 SERPL-SCNC: 25 MMOL/L (ref 23–31)
CREAT SERPL-MCNC: 0.73 MG/DL (ref 0.55–1.02)
CREAT/UREA NIT SERPL: 19
EST. AVERAGE GLUCOSE BLD GHB EST-MCNC: 139.9 MG/DL
GFR SERPLBLD CREATININE-BSD FMLA CKD-EPI: >60 ML/MIN/1.73/M2
GLOBULIN SER-MCNC: 3.8 GM/DL (ref 2.4–3.5)
GLUCOSE SERPL-MCNC: 139 MG/DL (ref 82–115)
HBA1C MFR BLD: 6.5 %
POTASSIUM SERPL-SCNC: 4.5 MMOL/L (ref 3.5–5.1)
PROT SERPL-MCNC: 7.7 GM/DL (ref 5.8–7.6)
SODIUM SERPL-SCNC: 140 MMOL/L (ref 136–145)

## 2025-07-28 PROCEDURE — 80053 COMPREHEN METABOLIC PANEL: CPT

## 2025-07-28 PROCEDURE — 83036 HEMOGLOBIN GLYCOSYLATED A1C: CPT

## 2025-07-28 PROCEDURE — 36415 COLL VENOUS BLD VENIPUNCTURE: CPT

## 2025-08-04 NOTE — PROGRESS NOTES
Subjective:        Patient ID: Dodie Brody is a 74 y.o. female.    Chief Complaint: Follow-up (DM follow up)      presents to the clinic unaccompanied for chronic condition follow up. She is due for her wellness visit in february.  She did labs prior to her appt       Did golo.  Doing mediterranean diet x 1.5 months. She has diabetes. Currently she is on metformin 850mg PO qAm.  She is trying her best to follow a diabetic diet.  Her last foot exam today. Her eye exams are with by Dr. Huertas 10/2024. her last A1c 6.5 1/2024.  urine micro 1/2024.       The patient was diagnosed with breast cancer per her screening mammogram in 3/2019. This was abnormal and resulted in a biopsy, subsequent lumpectomy of her left breast and then left mastectomy on May 6, 2019 with Dr. Rios. She reports she will not need chemotherapy or radiation. last visit 8/2020. last mammogram 2/3/25. I will order.      She has hypertension. The patient is on enalapril 20mg in am and norvasc 2.5mg. she has a bp machine at home. She is taking her medications as prescribed. she fills this at [facility]. She is on  asa. She also has hyperlipidemia and is on crestor 20mg. Is is on omega3.  She is now seeing cards for blockage in her legs.  On plavix. Has appt with dr. Beebe 2/2025     She has low vitamin D3 1000IU and supplements over-the-counter. Was wnl 1/2022.  She does not want to check this level because medicare does not cover the lab even on a wellness.      She is ALLERGIC to iodine. completed covid series and booster.      She is a former smoker. quit >17 years ago. Had ct lung cancer screening done 8/2018, 8/2019, 8/2020, 9/2021, 7/2022, 9/2023, 12/2024 all stable.      She declines shingles and tetanus and pneumonia vaccination. She had cologuard 3/2020 and 7/2023.   She has had a hysterectomy in her late 20s for noncancerous reasons and therefore no longer does pap smears. dexa 1/2020 showed osteopenia- taking her vitamin d and has not  "been taking calcium. repeat dexa done 2022.     Had cataract surgery with dr. Cooper.  Doing well.       she is mobility impaired and has tag. cannot walk more than 200ft without stopping to rest.  Asking for renewal for 11/15/24.              Review of Systems   Constitutional: Negative.    HENT: Negative.     Respiratory: Negative.     Cardiovascular: Negative.    Gastrointestinal: Negative.    Genitourinary: Negative.          Review of patient's allergies indicates:   Allergen Reactions    Iodine Shortness Of Breath     Injection  Can eat seafood      Vitals:    25 0823   BP: 138/80   BP Location: Right arm   Patient Position: Sitting   Pulse: 77   Resp: 16   Temp: 98.4 °F (36.9 °C)   TempSrc: Oral   SpO2: 98%   Weight: 94.7 kg (208 lb 12.8 oz)   Height: 5' 1" (1.549 m)      Social History     Socioeconomic History    Marital status:    Tobacco Use    Smoking status: Former     Current packs/day: 0.00     Average packs/day: 1.5 packs/day for 59.0 years (88.5 ttl pk-yrs)     Types: Cigarettes     Quit date: 2022     Years since quittin.7    Smokeless tobacco: Never    Tobacco comments:     Quit 20 years ago   Substance and Sexual Activity    Alcohol use: Yes     Comment: Once a year- on occasions    Drug use: Never    Sexual activity: Yes     Partners: Male     Birth control/protection: None     Social Drivers of Health     Financial Resource Strain: Patient Declined (2025)    Overall Financial Resource Strain (CARDIA)     Difficulty of Paying Living Expenses: Patient declined   Food Insecurity: Patient Declined (2025)    Hunger Vital Sign     Worried About Running Out of Food in the Last Year: Patient declined     Ran Out of Food in the Last Year: Patient declined   Transportation Needs: No Transportation Needs (2025)    PRAPARE - Transportation     Lack of Transportation (Medical): No     Lack of Transportation (Non-Medical): No   Physical Activity: Unknown (2025)    " Exercise Vital Sign     Days of Exercise per Week: Patient declined   Stress: Patient Declined (7/29/2025)    [employer] - Occupational Stress Questionnaire     Feeling of Stress : Patient declined   Housing Stability: Unknown (7/29/2025)    Housing Stability Vital Sign     Unable to Pay for Housing in the Last Year: No     Number of Times Moved in the Last Year: 0     Homeless in the Last Year: Patient declined      Family History   Problem Relation Name Age of Onset    Other Mother Emily Hernandez         heart issues    Heart disease Mother Emily Hernandez     Hypertension Mother Emily Hernandez     Lung disease Father          Black lung    Breast cancer Paternal Aunt  72    Arthritis Maternal Grandmother Lakisha Martinezau     Cancer Maternal Aunt Emily alvarado     Diabetes Maternal Aunt Jhonatan           Objective:     Physical Exam  Vitals and nursing note reviewed.   Constitutional:       Appearance: Normal appearance. She is obese.   HENT:      Head: Normocephalic and atraumatic.      Nose: Nose normal.      Mouth/Throat:      Mouth: Mucous membranes are moist.      Pharynx: Oropharynx is clear.   Eyes:      Extraocular Movements: Extraocular movements intact.   Cardiovascular:      Rate and Rhythm: Normal rate and regular rhythm.      Pulses: Normal pulses.           Dorsalis pedis pulses are 2+ on the right side and 2+ on the left side.        Posterior tibial pulses are 2+ on the right side and 2+ on the left side.      Heart sounds: Normal heart sounds.   Pulmonary:      Effort: Pulmonary effort is normal.      Breath sounds: Normal breath sounds.   Musculoskeletal:         General: Normal range of motion.      Cervical back: Normal range of motion.        Feet:    Feet:      Right foot:      Protective Sensation: 8 sites tested.  8 sites sensed.      Skin integrity: Skin integrity normal.      Left foot:      Protective Sensation: 8 sites tested.  8 sites sensed.      Skin integrity: Skin  integrity normal.   Skin:     General: Skin is warm and dry.   Neurological:      General: No focal deficit present.      Mental Status: She is alert and oriented to person, place, and time. Mental status is at baseline.   Psychiatric:         Mood and Affect: Mood normal.       Medications Ordered Prior to Encounter[1]  Health Maintenance   Topic Date Due    DEXA Scan  01/27/2024    COVID-19 Vaccine (5 - 2024-25 season) 09/01/2024    Foot Exam  06/03/2025    Diabetic Eye Exam  10/15/2025    RSV Vaccine (Age 60+ and Pregnant patients) (1 - Risk 60-74 years 1-dose series) 08/05/2025 (Originally 11/3/2010)    TETANUS VACCINE  08/05/2026 (Originally 11/3/1968)    Shingles Vaccine (1 of 2) 08/05/2026 (Originally 11/3/2000)    Influenza Vaccine (1) 09/01/2025    LDCT Lung Screen  12/05/2025    Diabetes Urine Screening  01/28/2026    Lipid Panel  01/28/2026    Hemoglobin A1c  01/28/2026    High Dose Statin  02/04/2026    Mammogram  02/06/2026    Colorectal Cancer Screening  07/30/2026    Hepatitis C Screening  Completed    Pneumococcal Vaccines (Age 50+)  Completed      Results for orders placed or performed in visit on 07/28/25   Comprehensive Metabolic Panel    Collection Time: 07/28/25  8:47 AM   Result Value Ref Range    Sodium 140 136 - 145 mmol/L    Potassium 4.5 3.5 - 5.1 mmol/L    Chloride 108 (H) 98 - 107 mmol/L    CO2 25 23 - 31 mmol/L    Glucose 139 (H) 82 - 115 mg/dL    Blood Urea Nitrogen 14.2 9.8 - 20.1 mg/dL    Creatinine 0.73 0.55 - 1.02 mg/dL    Calcium 9.5 8.4 - 10.2 mg/dL    Protein Total 7.7 (H) 5.8 - 7.6 gm/dL    Albumin 3.9 3.4 - 4.8 g/dL    Globulin 3.8 (H) 2.4 - 3.5 gm/dL    Albumin/Globulin Ratio 1.0 (L) 1.1 - 2.0 ratio    Bilirubin Total 0.6 <=1.5 mg/dL    ALP 54 40 - 150 unit/L    ALT 15 0 - 55 unit/L    AST 15 11 - 45 unit/L    eGFR >60 mL/min/1.73/m2    Anion Gap 7.0 mEq/L    BUN/Creatinine Ratio 19    Hemoglobin A1C    Collection Time: 07/28/25  8:47 AM   Result Value Ref Range    Hemoglobin  A1c 6.5 <=7.0 %    Estimated Average Glucose 139.9 mg/dL          Assessment & Plan:     Active Problem List with Overview Notes    Diagnosis Date Noted    Fatigue 06/03/2024    Aortic atherosclerosis 07/26/2023    Colon cancer screening 07/26/2023    Advanced care planning/counseling discussion 01/26/2023    History of breast cancer 01/26/2023    Medicare annual wellness visit, subsequent 07/25/2022    Postmenopausal 07/25/2022    Breast cancer screening by mammogram 07/25/2022    Hypertension associated with diabetes 07/25/2022    Hyperlipidemia associated with type 2 diabetes mellitus 07/25/2022    Diabetes mellitus 07/25/2022    Impaired mobility 07/25/2022    Osteopenia 07/25/2022    Vitamin D deficiency 07/25/2022    Tetanus, diphtheria, and acellular pertussis (Tdap) vaccination declined 07/25/2022    Lung mass 07/25/2022    Ex-cigarette smoker 07/25/2022    Class 2 severe obesity due to excess calories with serious comorbidity and body mass index (BMI) of 39.0 to 39.9 in adult 07/25/2022       1. Type 2 diabetes mellitus with hyperglycemia, without long-term current use of insulin  Assessment & Plan:  Lab Results   Component Value Date    HGBA1C 6.5 07/28/2025     Urine micro 1/2025  Foot exam today  Eye exam with dr. Huertas. 10/2024.  Scheduled 10/2025.    On metformin 850 mg qday.  On acei and statin.      Continue to follow dmii diet. Continue to monitor cbgs.   Contact clinic for concerns.     Orders:  -     CBC Auto Differential; Future; Expected date: 02/05/2026  -     Comprehensive Metabolic Panel; Future; Expected date: 02/05/2026  -     Lipid Panel; Future; Expected date: 02/05/2026  -     TSH; Future; Expected date: 02/05/2026  -     Hemoglobin A1C; Future; Expected date: 02/05/2026  -     Urinalysis; Future; Expected date: 02/05/2026  -     Vitamin D; Future; Expected date: 02/05/2026  -     Microalbumin/Creatinine Ratio, Urine; Future; Expected date: 02/05/2026  -     metFORMIN (GLUCOPHAGE) 850 MG  "tablet; Take 1 tablet (850 mg total) by mouth daily with breakfast.  Dispense: 90 tablet; Refill: 3  -     Diabetes Digital Medicine (DDMP) Enrollment Order    2. Hypertension associated with diabetes  Assessment & Plan:  Well controlled on current prescriptions. On asa. Keep appts with cards    Orders:  -     CBC Auto Differential; Future; Expected date: 02/05/2026  -     Comprehensive Metabolic Panel; Future; Expected date: 02/05/2026  -     Lipid Panel; Future; Expected date: 02/05/2026  -     TSH; Future; Expected date: 02/05/2026  -     Hemoglobin A1C; Future; Expected date: 02/05/2026  -     Urinalysis; Future; Expected date: 02/05/2026  -     Vitamin D; Future; Expected date: 02/05/2026  -     Microalbumin/Creatinine Ratio, Urine; Future; Expected date: 02/05/2026  -     Hypertension Digital Medicine (HDMP) Enrollment Order    3. Hyperlipidemia associated with type 2 diabetes mellitus  Assessment & Plan:  On statin. Keep appts with cards    Lab Results   Component Value Date    CHOL 124 01/28/2025    CHOL 169 01/23/2024    CHOL 159 01/19/2023     Lab Results   Component Value Date    HDL 44 01/28/2025    HDL 53 01/23/2024    HDL 47 01/19/2023     Lab Results   Component Value Date    LDLCALC 59.00 01/28/2025    LDLCALC 89.00 01/23/2024    LDLCALC 90.00 01/19/2023     Lab Results   Component Value Date    TRIG 103 01/28/2025    TRIG 133 01/23/2024    TRIG 109 01/19/2023       No results found for: "CHOLHDL"      Orders:  -     CBC Auto Differential; Future; Expected date: 02/05/2026  -     Comprehensive Metabolic Panel; Future; Expected date: 02/05/2026  -     Lipid Panel; Future; Expected date: 02/05/2026  -     TSH; Future; Expected date: 02/05/2026  -     Hemoglobin A1C; Future; Expected date: 02/05/2026  -     Urinalysis; Future; Expected date: 02/05/2026  -     Vitamin D; Future; Expected date: 02/05/2026  -     Microalbumin/Creatinine Ratio, Urine; Future; Expected date: 02/05/2026    4. Breast cancer " screening by mammogram  Assessment & Plan:  Dx 3/2019.  History of left lumpectomy and then mastectomy 5/2019.  No chemo or radiation.  keep appts with dr. Rios,   Continue annual screening. [facility] 2/2025.   Ordered.     Orders:  -     Mammo Digital Screening Bilat w/ Keegan (XPD); Future; Expected date: 02/09/2026  -     CBC Auto Differential; Future; Expected date: 02/05/2026  -     Comprehensive Metabolic Panel; Future; Expected date: 02/05/2026  -     Lipid Panel; Future; Expected date: 02/05/2026  -     TSH; Future; Expected date: 02/05/2026  -     Hemoglobin A1C; Future; Expected date: 02/05/2026  -     Urinalysis; Future; Expected date: 02/05/2026  -     Vitamin D; Future; Expected date: 02/05/2026  -     Microalbumin/Creatinine Ratio, Urine; Future; Expected date: 02/05/2026    5. Postmenopausal  Assessment & Plan:  Declines dexa    Orders:  -     Cancel: DXA Bone Density Axial Skeleton 1 or more sites; Future; Expected date: 08/05/2025  -     CBC Auto Differential; Future; Expected date: 02/05/2026  -     Comprehensive Metabolic Panel; Future; Expected date: 02/05/2026  -     Lipid Panel; Future; Expected date: 02/05/2026  -     TSH; Future; Expected date: 02/05/2026  -     Hemoglobin A1C; Future; Expected date: 02/05/2026  -     Urinalysis; Future; Expected date: 02/05/2026  -     Vitamin D; Future; Expected date: 02/05/2026  -     Microalbumin/Creatinine Ratio, Urine; Future; Expected date: 02/05/2026    6. Vitamin D deficiency  Assessment & Plan:  Continue to supplement otc    Orders:  -     CBC Auto Differential; Future; Expected date: 02/05/2026  -     Comprehensive Metabolic Panel; Future; Expected date: 02/05/2026  -     Lipid Panel; Future; Expected date: 02/05/2026  -     TSH; Future; Expected date: 02/05/2026  -     Hemoglobin A1C; Future; Expected date: 02/05/2026  -     Urinalysis; Future; Expected date: 02/05/2026  -     Vitamin D; Future; Expected date: 02/05/2026  -     Microalbumin/Creatinine  Ratio, Urine; Future; Expected date: 02/05/2026    7. Medicare annual wellness visit, subsequent  -     CBC Auto Differential; Future; Expected date: 02/05/2026  -     Comprehensive Metabolic Panel; Future; Expected date: 02/05/2026  -     Lipid Panel; Future; Expected date: 02/05/2026  -     TSH; Future; Expected date: 02/05/2026  -     Hemoglobin A1C; Future; Expected date: 02/05/2026  -     Urinalysis; Future; Expected date: 02/05/2026  -     Vitamin D; Future; Expected date: 02/05/2026  -     Microalbumin/Creatinine Ratio, Urine; Future; Expected date: 02/05/2026    8. Class 2 severe obesity due to excess calories with serious comorbidity and body mass index (BMI) of 39.0 to 39.9 in adult  Assessment & Plan:  Encouraged lifestyle change           Follow up in about 6 months (around 2/5/2026) for Medicare Wellness with labs.          [1]   Current Outpatient Medications on File Prior to Visit   Medication Sig Dispense Refill    amLODIPine (NORVASC) 2.5 MG tablet Take 2.5 mg by mouth.      aspirin (ECOTRIN) 81 MG EC tablet Take 81 mg by mouth once daily.      calcium-vitamin D3 (OS-MICHELLE 500 + D3) 500 mg-5 mcg (200 unit) per tablet Take 1 tablet by mouth.      clopidogreL (PLAVIX) 75 mg tablet Take 75 mg by mouth once daily.      enalapril (VASOTEC) 20 MG tablet Take 1 tablet (20 mg total) by mouth once daily. 90 tablet 3    metoprolol succinate (TOPROL-XL) 25 MG 24 hr tablet Take 25 mg by mouth.      rosuvastatin (CRESTOR) 20 MG tablet Take 20 mg by mouth.      [DISCONTINUED] metFORMIN (GLUCOPHAGE) 850 MG tablet TAKE ONE TABLET BY MOUTH EVERY MORNING WITH BREAKFAST 90 tablet 3     No current facility-administered medications on file prior to visit.

## 2025-08-05 ENCOUNTER — OFFICE VISIT (OUTPATIENT)
Dept: FAMILY MEDICINE | Facility: CLINIC | Age: 75
End: 2025-08-05
Payer: MEDICARE

## 2025-08-05 VITALS
TEMPERATURE: 98 F | OXYGEN SATURATION: 98 % | SYSTOLIC BLOOD PRESSURE: 138 MMHG | RESPIRATION RATE: 16 BRPM | HEART RATE: 77 BPM | DIASTOLIC BLOOD PRESSURE: 80 MMHG | WEIGHT: 208.81 LBS | HEIGHT: 61 IN | BODY MASS INDEX: 39.42 KG/M2

## 2025-08-05 DIAGNOSIS — E66.812 CLASS 2 SEVERE OBESITY DUE TO EXCESS CALORIES WITH SERIOUS COMORBIDITY AND BODY MASS INDEX (BMI) OF 39.0 TO 39.9 IN ADULT: ICD-10-CM

## 2025-08-05 DIAGNOSIS — E11.59 HYPERTENSION ASSOCIATED WITH DIABETES: ICD-10-CM

## 2025-08-05 DIAGNOSIS — Z12.31 BREAST CANCER SCREENING BY MAMMOGRAM: ICD-10-CM

## 2025-08-05 DIAGNOSIS — I15.2 HYPERTENSION ASSOCIATED WITH DIABETES: ICD-10-CM

## 2025-08-05 DIAGNOSIS — Z00.00 MEDICARE ANNUAL WELLNESS VISIT, SUBSEQUENT: ICD-10-CM

## 2025-08-05 DIAGNOSIS — E11.69 HYPERLIPIDEMIA ASSOCIATED WITH TYPE 2 DIABETES MELLITUS: ICD-10-CM

## 2025-08-05 DIAGNOSIS — E55.9 VITAMIN D DEFICIENCY: ICD-10-CM

## 2025-08-05 DIAGNOSIS — E11.65 TYPE 2 DIABETES MELLITUS WITH HYPERGLYCEMIA, WITHOUT LONG-TERM CURRENT USE OF INSULIN: Primary | ICD-10-CM

## 2025-08-05 DIAGNOSIS — E78.5 HYPERLIPIDEMIA ASSOCIATED WITH TYPE 2 DIABETES MELLITUS: ICD-10-CM

## 2025-08-05 DIAGNOSIS — Z78.0 POSTMENOPAUSAL: ICD-10-CM

## 2025-08-05 DIAGNOSIS — E66.01 CLASS 2 SEVERE OBESITY DUE TO EXCESS CALORIES WITH SERIOUS COMORBIDITY AND BODY MASS INDEX (BMI) OF 39.0 TO 39.9 IN ADULT: ICD-10-CM

## 2025-08-05 PROCEDURE — 99214 OFFICE O/P EST MOD 30 MIN: CPT | Mod: ,,, | Performed by: FAMILY MEDICINE

## 2025-08-05 RX ORDER — METFORMIN HYDROCHLORIDE 850 MG/1
850 TABLET ORAL
Qty: 90 TABLET | Refills: 3 | Status: SHIPPED | OUTPATIENT
Start: 2025-08-05 | End: 2026-08-05

## 2025-08-05 NOTE — ASSESSMENT & PLAN NOTE
Dx 3/2019.  History of left lumpectomy and then mastectomy 5/2019.  No chemo or radiation.  keep appts with dr. Rios,   Continue annual screening. [facility] 2/2025.   Ordered.

## 2025-08-05 NOTE — ASSESSMENT & PLAN NOTE
"On statin. Keep appts with cards    Lab Results   Component Value Date    CHOL 124 01/28/2025    CHOL 169 01/23/2024    CHOL 159 01/19/2023     Lab Results   Component Value Date    HDL 44 01/28/2025    HDL 53 01/23/2024    HDL 47 01/19/2023     Lab Results   Component Value Date    LDLCALC 59.00 01/28/2025    LDLCALC 89.00 01/23/2024    LDLCALC 90.00 01/19/2023     Lab Results   Component Value Date    TRIG 103 01/28/2025    TRIG 133 01/23/2024    TRIG 109 01/19/2023       No results found for: "CHOLHDL"    "

## 2025-08-05 NOTE — ASSESSMENT & PLAN NOTE
Lab Results   Component Value Date    HGBA1C 6.5 07/28/2025     Urine micro 1/2025  Foot exam today  Eye exam with dr. Huertas. 10/2024.  Scheduled 10/2025.    On metformin 850 mg qday.  On acei and statin.      Continue to follow dmii diet. Continue to monitor cbgs.   Contact clinic for concerns.

## 2025-08-13 ENCOUNTER — PATIENT MESSAGE (OUTPATIENT)
Dept: FAMILY MEDICINE | Facility: CLINIC | Age: 75
End: 2025-08-13
Payer: MEDICARE

## (undated) DEVICE — CATH MICRO CORSAIR PRO 135CM

## (undated) DEVICE — GUIDEWIRE FIELDER XT.014X190CM

## (undated) DEVICE — VALVE CONTROL COPILOT

## (undated) DEVICE — KIT MANIFOLD LOW PRESS TUBING

## (undated) DEVICE — GUIDEWIRE RUNTHROUGH NS 180CM

## (undated) DEVICE — CANNULA ADULT NASAL 7FT

## (undated) DEVICE — BAND TR COMP DEVICE REG 24CM

## (undated) DEVICE — GUIDEWIRE INQWIRE SE 3MM JTIP

## (undated) DEVICE — KIT GLIDESHEATH NIT 6FR 10CM

## (undated) DEVICE — TUBING HP AIRLSS ROT ADPT 30IN

## (undated) DEVICE — PAD DEFIB CADENCE ADULT R2

## (undated) DEVICE — CATH FL 3.5 5FR

## (undated) DEVICE — DEVICE BASIXCOMPAK INFL 20ML

## (undated) DEVICE — KIT HAND CONTROL HIGH PRESSUR

## (undated) DEVICE — CATH OPTITORQUE RADIAL 5FR

## (undated) DEVICE — KIT SYR REUSABLE

## (undated) DEVICE — GUIDE LAUNCHER 6FR EBU 3.0

## (undated) DEVICE — Device

## (undated) DEVICE — STOPCOCK 3-WAY

## (undated) DEVICE — SYS WASTE FLD DISPOSAL 1400ML